# Patient Record
Sex: FEMALE | Race: BLACK OR AFRICAN AMERICAN | NOT HISPANIC OR LATINO
[De-identification: names, ages, dates, MRNs, and addresses within clinical notes are randomized per-mention and may not be internally consistent; named-entity substitution may affect disease eponyms.]

---

## 2019-10-18 ENCOUNTER — APPOINTMENT (OUTPATIENT)
Age: 64
End: 2019-10-18
Payer: MEDICARE

## 2019-10-18 ENCOUNTER — NON-APPOINTMENT (OUTPATIENT)
Age: 64
End: 2019-10-18

## 2019-10-18 PROCEDURE — 92250 FUNDUS PHOTOGRAPHY W/I&R: CPT

## 2019-10-18 PROCEDURE — 92020 GONIOSCOPY: CPT

## 2019-10-18 PROCEDURE — 92083 EXTENDED VISUAL FIELD XM: CPT

## 2019-10-18 PROCEDURE — 92004 COMPRE OPH EXAM NEW PT 1/>: CPT

## 2019-10-26 ENCOUNTER — INPATIENT (INPATIENT)
Facility: HOSPITAL | Age: 64
LOS: 1 days | Discharge: HOME | DRG: 204 | End: 2019-10-28
Attending: INTERNAL MEDICINE | Admitting: INTERNAL MEDICINE
Payer: MEDICARE

## 2019-10-26 VITALS
OXYGEN SATURATION: 99 % | HEIGHT: 66 IN | DIASTOLIC BLOOD PRESSURE: 87 MMHG | TEMPERATURE: 98 F | WEIGHT: 158.95 LBS | HEART RATE: 108 BPM | RESPIRATION RATE: 20 BRPM | SYSTOLIC BLOOD PRESSURE: 133 MMHG

## 2019-10-26 DIAGNOSIS — Z90.49 ACQUIRED ABSENCE OF OTHER SPECIFIED PARTS OF DIGESTIVE TRACT: Chronic | ICD-10-CM

## 2019-10-26 LAB
ALBUMIN SERPL ELPH-MCNC: 4.1 G/DL — SIGNIFICANT CHANGE UP (ref 3.3–5)
ALP SERPL-CCNC: 61 U/L — SIGNIFICANT CHANGE UP (ref 40–120)
ALT FLD-CCNC: 23 U/L — SIGNIFICANT CHANGE UP (ref 10–45)
ANION GAP SERPL CALC-SCNC: 12 MMOL/L — SIGNIFICANT CHANGE UP (ref 5–17)
APPEARANCE UR: CLEAR — SIGNIFICANT CHANGE UP
AST SERPL-CCNC: 65 U/L — HIGH (ref 10–40)
BILIRUB SERPL-MCNC: 0.3 MG/DL — SIGNIFICANT CHANGE UP (ref 0.2–1.2)
BILIRUB UR-MCNC: NEGATIVE — SIGNIFICANT CHANGE UP
BUN SERPL-MCNC: 8 MG/DL — SIGNIFICANT CHANGE UP (ref 7–23)
CALCIUM SERPL-MCNC: 10 MG/DL — SIGNIFICANT CHANGE UP (ref 8.4–10.5)
CHLORIDE SERPL-SCNC: 102 MMOL/L — SIGNIFICANT CHANGE UP (ref 96–108)
CO2 SERPL-SCNC: 24 MMOL/L — SIGNIFICANT CHANGE UP (ref 22–31)
COLOR SPEC: YELLOW — SIGNIFICANT CHANGE UP
CREAT SERPL-MCNC: 0.74 MG/DL — SIGNIFICANT CHANGE UP (ref 0.5–1.3)
D DIMER BLD IA.RAPID-MCNC: 383 NG/ML DDU — HIGH
DIFF PNL FLD: NEGATIVE — SIGNIFICANT CHANGE UP
GLUCOSE SERPL-MCNC: 142 MG/DL — HIGH (ref 70–99)
GLUCOSE UR QL: NEGATIVE — SIGNIFICANT CHANGE UP
HCT VFR BLD CALC: 39.3 % — SIGNIFICANT CHANGE UP (ref 34.5–45)
HGB BLD-MCNC: 12 G/DL — SIGNIFICANT CHANGE UP (ref 11.5–15.5)
KETONES UR-MCNC: NEGATIVE — SIGNIFICANT CHANGE UP
LEUKOCYTE ESTERASE UR-ACNC: NEGATIVE — SIGNIFICANT CHANGE UP
MAGNESIUM SERPL-MCNC: 1.8 MG/DL — SIGNIFICANT CHANGE UP (ref 1.6–2.6)
MCHC RBC-ENTMCNC: 28.6 PG — SIGNIFICANT CHANGE UP (ref 27–34)
MCHC RBC-ENTMCNC: 30.5 GM/DL — LOW (ref 32–36)
MCV RBC AUTO: 93.6 FL — SIGNIFICANT CHANGE UP (ref 80–100)
NITRITE UR-MCNC: NEGATIVE — SIGNIFICANT CHANGE UP
NRBC # BLD: 0 /100 WBCS — SIGNIFICANT CHANGE UP (ref 0–0)
NT-PROBNP SERPL-SCNC: 7 PG/ML — SIGNIFICANT CHANGE UP (ref 0–300)
PH UR: 6.5 — SIGNIFICANT CHANGE UP (ref 5–8)
PLATELET # BLD AUTO: 287 K/UL — SIGNIFICANT CHANGE UP (ref 150–400)
POTASSIUM SERPL-MCNC: 4 MMOL/L — SIGNIFICANT CHANGE UP (ref 3.5–5.3)
POTASSIUM SERPL-SCNC: 4 MMOL/L — SIGNIFICANT CHANGE UP (ref 3.5–5.3)
PROT SERPL-MCNC: 7.7 G/DL — SIGNIFICANT CHANGE UP (ref 6–8.3)
PROT UR-MCNC: NEGATIVE MG/DL — SIGNIFICANT CHANGE UP
RBC # BLD: 4.2 M/UL — SIGNIFICANT CHANGE UP (ref 3.8–5.2)
RBC # FLD: 13.5 % — SIGNIFICANT CHANGE UP (ref 10.3–14.5)
SODIUM SERPL-SCNC: 138 MMOL/L — SIGNIFICANT CHANGE UP (ref 135–145)
SP GR SPEC: 1.01 — SIGNIFICANT CHANGE UP (ref 1–1.03)
TROPONIN T SERPL-MCNC: 0.07 NG/ML — CRITICAL HIGH (ref 0–0.01)
UROBILINOGEN FLD QL: 0.2 E.U./DL — SIGNIFICANT CHANGE UP
WBC # BLD: 2.82 K/UL — LOW (ref 3.8–10.5)
WBC # FLD AUTO: 2.82 K/UL — LOW (ref 3.8–10.5)

## 2019-10-26 PROCEDURE — 71275 CT ANGIOGRAPHY CHEST: CPT | Mod: 26

## 2019-10-26 PROCEDURE — 99285 EMERGENCY DEPT VISIT HI MDM: CPT | Mod: 25

## 2019-10-26 PROCEDURE — 71046 X-RAY EXAM CHEST 2 VIEWS: CPT | Mod: 26

## 2019-10-26 PROCEDURE — 93010 ELECTROCARDIOGRAM REPORT: CPT

## 2019-10-26 RX ORDER — BUDESONIDE AND FORMOTEROL FUMARATE DIHYDRATE 160; 4.5 UG/1; UG/1
2 AEROSOL RESPIRATORY (INHALATION)
Refills: 0 | Status: DISCONTINUED | OUTPATIENT
Start: 2019-10-26 | End: 2019-10-28

## 2019-10-26 RX ORDER — ALBUTEROL 90 UG/1
2 AEROSOL, METERED ORAL EVERY 6 HOURS
Refills: 0 | Status: DISCONTINUED | OUTPATIENT
Start: 2019-10-26 | End: 2019-10-28

## 2019-10-26 RX ORDER — ASPIRIN/CALCIUM CARB/MAGNESIUM 324 MG
325 TABLET ORAL ONCE
Refills: 0 | Status: COMPLETED | OUTPATIENT
Start: 2019-10-26 | End: 2019-10-26

## 2019-10-26 RX ORDER — MECLIZINE HCL 12.5 MG
25 TABLET ORAL THREE TIMES A DAY
Refills: 0 | Status: DISCONTINUED | OUTPATIENT
Start: 2019-10-26 | End: 2019-10-28

## 2019-10-26 RX ORDER — HYDROXYCHLOROQUINE SULFATE 200 MG
200 TABLET ORAL
Refills: 0 | Status: DISCONTINUED | OUTPATIENT
Start: 2019-10-26 | End: 2019-10-28

## 2019-10-26 RX ORDER — PANTOPRAZOLE SODIUM 20 MG/1
40 TABLET, DELAYED RELEASE ORAL
Refills: 0 | Status: DISCONTINUED | OUTPATIENT
Start: 2019-10-26 | End: 2019-10-28

## 2019-10-26 RX ORDER — LOSARTAN POTASSIUM 100 MG/1
25 TABLET, FILM COATED ORAL DAILY
Refills: 0 | Status: DISCONTINUED | OUTPATIENT
Start: 2019-10-26 | End: 2019-10-28

## 2019-10-26 RX ORDER — INFLUENZA VIRUS VACCINE 15; 15; 15; 15 UG/.5ML; UG/.5ML; UG/.5ML; UG/.5ML
0.5 SUSPENSION INTRAMUSCULAR ONCE
Refills: 0 | Status: DISCONTINUED | OUTPATIENT
Start: 2019-10-26 | End: 2019-10-28

## 2019-10-26 RX ORDER — ACETAMINOPHEN 500 MG
650 TABLET ORAL EVERY 6 HOURS
Refills: 0 | Status: DISCONTINUED | OUTPATIENT
Start: 2019-10-26 | End: 2019-10-28

## 2019-10-26 RX ORDER — NITROGLYCERIN 6.5 MG
0.4 CAPSULE, EXTENDED RELEASE ORAL
Refills: 0 | Status: DISCONTINUED | OUTPATIENT
Start: 2019-10-26 | End: 2019-10-28

## 2019-10-26 RX ORDER — ONDANSETRON 8 MG/1
4 TABLET, FILM COATED ORAL THREE TIMES A DAY
Refills: 0 | Status: DISCONTINUED | OUTPATIENT
Start: 2019-10-26 | End: 2019-10-28

## 2019-10-26 RX ORDER — ASPIRIN/CALCIUM CARB/MAGNESIUM 324 MG
81 TABLET ORAL DAILY
Refills: 0 | Status: DISCONTINUED | OUTPATIENT
Start: 2019-10-27 | End: 2019-10-28

## 2019-10-26 RX ORDER — IPRATROPIUM/ALBUTEROL SULFATE 18-103MCG
3 AEROSOL WITH ADAPTER (GRAM) INHALATION
Refills: 0 | Status: DISCONTINUED | OUTPATIENT
Start: 2019-10-26 | End: 2019-10-28

## 2019-10-26 RX ADMIN — Medication 325 MILLIGRAM(S): at 18:31

## 2019-10-26 NOTE — ED ADULT NURSE NOTE - OBJECTIVE STATEMENT
Pt. w/ Hx of HTN, asthma, and RA c/o on/off palpitations for over a month and on/off SOB x 1 month. Pt. 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 Pt. also reports nonproductive cough in last month, does not recall for how long. Pt. speaking in clear complete sentences on RA, breath sounds clear bilaterally on auscultation. Pt. also c/o on/off LLQ pain since last night, states she has Hx of ?fibroid/cyst. Pt. denies fever, chills, body aches. Ambulatory w/ cane at baseline due to RA.

## 2019-10-26 NOTE — H&P ADULT - NSHPLABSRESULTS_GEN_ALL_CORE
Troponin T, Serum (10.26.19 @ 17:03)    Troponin T, Serum: 0.07: TYPE:(C=Critical, N=Notification, A=Abnormal) C  TESTS: _TROPT  DATE/TIME CALLED: _10/26/19 17:46  CALLED TO: NOREBRT HUGHES  READ BACK (2 Patient Identifiers)(Y/N): _Y  READ BACK VALUES (Y/N): _Y  CALLED BY: CHARLETTE  Reference interval for troponin T is </= 0.01 ng/mL which includes the  99th percentile of a healthy population. Troponin T results are not  interchangeable with troponin I results. ng/mL  D-Dimer Assay, Quantitative (10.26.19 @ 17:03)    D-Dimer Assay, Quantitative: 383: The negative cutoff limit for DVT or PE is 230 D-DU ng/mL. This test  should be used as an aid in diagnosis and not be used to exclude deep  vein thrombosis or pulmonary embolism. ng/mL DDU

## 2019-10-26 NOTE — H&P ADULT - NSHPRISKHIVSCREEN_GEN_ALL_CORE
Mild oral stage dysphagia characterized by anterior loss with liquids, inconsistently, due to decreased labial seal on the right & by prolonged bolus preparation with chewable solids likely d/t sleepy/post-op status & not a true mechanical dysphagia. Pharyngeal swallow appears intact with no overt signs of aspiration at this time. Offered and patient declined

## 2019-10-26 NOTE — ED PROVIDER NOTE - DIAGNOSTIC INTERPRETATION
ER Physician: Maye Pavon MD  CHEST XRAY INTERPRETATION: lungs clear, heart shadow normal, bony structures intact

## 2019-10-26 NOTE — H&P ADULT - ASSESSMENT
Patient is a 64 year old female with PMHx of RA, Asthma, HTN, who presents to [facility] ED with complaints of chest pain and SOB for the past month. Patient reports pain is a brief stabbing pain beneath the left breast that comes and goes at rest and is with out radiation and not worsened with exertion. She also reports SOB which is occurring at rest but worsened with ambulation. She reports symptoms were worse last night and she had a dry hacking cough which prompted her to come to ED for further evaluation. She does admit to intermittent dizziness but denies palpitations, orthopnea, pnd, palpitations, LE edema or syncope. There is no prior hx of CAD/CHF/Arrythmia. On arrival Troponin was noted to be 0.07 and D-Dimer was elevated at 383. BNP was 7. Patient denies any calf pain or cramping. 12 Lead EKG revealed SR with non specific ST changes.    Impression  1. Atypical Chest Pain  2. Dyspnea  3. HTN  4. RA  5. Asthma  6. Minimally elevated troponin 0.07  7. Elevated D-Dimer 383  8. No CHF, BNP 7    Recommendations  1. Trend troponin  2. Add ASA 81  3. Check CTA PE protocol  4. Check 2D echo  5. Ischemic eval  6. Cont ARB

## 2019-10-26 NOTE — ED ADULT TRIAGE NOTE - CHIEF COMPLAINT QUOTE
pt c/o intermittent SOB on exertion and palpitations for about 1 month. pt report dry cough as well. denies fever, chills.  ekg in progress.

## 2019-10-26 NOTE — ED PROVIDER NOTE - OBJECTIVE STATEMENT
hx of RA, htn w complaints of shob for one month, w assoc dry cough. no chest pain, has had leg pain one month ago. no early fH CAD, non smoker, no personal fH CAD.  Pt has not tried anything for symptoms, no other aggravating or relieving factors. 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 hx of RA, htn w complaints of shob for one month worse w exertion, w assoc dry cough. no chest pain, has had leg pain one month ago. no early fH CAD, non smoker, no personal hx of  CAD.  Pt has not tried anything for symptoms, no other aggravating or relieving factors. 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

## 2019-10-26 NOTE — H&P ADULT - GASTROINTESTINAL DETAILS
normal/bowel sounds normal/no organomegaly/no bruit/no distention/nontender/soft/no masses palpable/no guarding/no rebound tenderness/no rigidity

## 2019-10-26 NOTE — H&P ADULT - HISTORY OF PRESENT ILLNESS
Patient is a 64 year old female with PMHx of RA, Asthma, HTN, who presents to [facility] ED with complaints of chest pain and SOB for the past month. Patient reports pain is a brief stabbing pain beneath the left breast that comes and goes at rest and is with out radiation and not worsened with exertion. She also reports SOB which is occurring at rest but worsened with ambulation. She reports symptoms were worse last night and she had a dry hacking cough which prompted her to come to ED for further evaluation. She does admit to intermittent dizziness but denies palpitations, orthopnea, pnd, palpitations, LE edema or syncope. There is no prior hx of CAD/CHF/Arrythmia. On arrival Troponin was noted to be 0.07 and D-Dimer was elevated at 383. BNP was 7. Patient denies any calf pain or cramping. 12 Lead EKG revealed SR with non specific ST changes.

## 2019-10-26 NOTE — ED PROVIDER NOTE - CLINICAL SUMMARY MEDICAL DECISION MAKING FREE TEXT BOX
Pt w sob, worse w exertion, no sob, cp at present at rest. well appearing, labs +trop, mildly elevated d-dimer, CTA negative. will admit for further ACS eval.

## 2019-10-27 ENCOUNTER — TRANSCRIPTION ENCOUNTER (OUTPATIENT)
Age: 64
End: 2019-10-27

## 2019-10-27 DIAGNOSIS — J45.909 UNSPECIFIED ASTHMA, UNCOMPLICATED: ICD-10-CM

## 2019-10-27 DIAGNOSIS — I10 ESSENTIAL (PRIMARY) HYPERTENSION: ICD-10-CM

## 2019-10-27 DIAGNOSIS — R06.00 DYSPNEA, UNSPECIFIED: ICD-10-CM

## 2019-10-27 DIAGNOSIS — R07.9 CHEST PAIN, UNSPECIFIED: ICD-10-CM

## 2019-10-27 DIAGNOSIS — M06.9 RHEUMATOID ARTHRITIS, UNSPECIFIED: ICD-10-CM

## 2019-10-27 LAB
ANION GAP SERPL CALC-SCNC: 13 MMOL/L — SIGNIFICANT CHANGE UP (ref 5–17)
APTT BLD: 27.3 SEC — LOW (ref 27.5–36.3)
BUN SERPL-MCNC: 13 MG/DL — SIGNIFICANT CHANGE UP (ref 7–23)
CALCIUM SERPL-MCNC: 9.7 MG/DL — SIGNIFICANT CHANGE UP (ref 8.4–10.5)
CHLORIDE SERPL-SCNC: 103 MMOL/L — SIGNIFICANT CHANGE UP (ref 96–108)
CHOLEST SERPL-MCNC: 232 MG/DL — HIGH (ref 10–199)
CO2 SERPL-SCNC: 24 MMOL/L — SIGNIFICANT CHANGE UP (ref 22–31)
CREAT SERPL-MCNC: 0.83 MG/DL — SIGNIFICANT CHANGE UP (ref 0.5–1.3)
CRP SERPL-MCNC: 1.27 MG/DL — HIGH (ref 0–0.4)
ERYTHROCYTE [SEDIMENTATION RATE] IN BLOOD: 55 MM/HR — HIGH
GLUCOSE SERPL-MCNC: 96 MG/DL — SIGNIFICANT CHANGE UP (ref 70–99)
HCT VFR BLD CALC: 37.9 % — SIGNIFICANT CHANGE UP (ref 34.5–45)
HCV AB S/CO SERPL IA: 0.06 S/CO — SIGNIFICANT CHANGE UP
HCV AB SERPL-IMP: SIGNIFICANT CHANGE UP
HDLC SERPL-MCNC: 45 MG/DL — LOW
HGB BLD-MCNC: 11.5 G/DL — SIGNIFICANT CHANGE UP (ref 11.5–15.5)
INR BLD: 1.04 — SIGNIFICANT CHANGE UP (ref 0.88–1.16)
LIPID PNL WITH DIRECT LDL SERPL: 170 MG/DL — HIGH
MCHC RBC-ENTMCNC: 28.7 PG — SIGNIFICANT CHANGE UP (ref 27–34)
MCHC RBC-ENTMCNC: 30.3 GM/DL — LOW (ref 32–36)
MCV RBC AUTO: 94.5 FL — SIGNIFICANT CHANGE UP (ref 80–100)
NRBC # BLD: 0 /100 WBCS — SIGNIFICANT CHANGE UP (ref 0–0)
PLATELET # BLD AUTO: 283 K/UL — SIGNIFICANT CHANGE UP (ref 150–400)
POTASSIUM SERPL-MCNC: 4.3 MMOL/L — SIGNIFICANT CHANGE UP (ref 3.5–5.3)
POTASSIUM SERPL-SCNC: 4.3 MMOL/L — SIGNIFICANT CHANGE UP (ref 3.5–5.3)
PROTHROM AB SERPL-ACNC: 11.8 SEC — SIGNIFICANT CHANGE UP (ref 10–12.9)
RBC # BLD: 4.01 M/UL — SIGNIFICANT CHANGE UP (ref 3.8–5.2)
RBC # FLD: 13.8 % — SIGNIFICANT CHANGE UP (ref 10.3–14.5)
SODIUM SERPL-SCNC: 140 MMOL/L — SIGNIFICANT CHANGE UP (ref 135–145)
TOTAL CHOLESTEROL/HDL RATIO MEASUREMENT: 5.2 RATIO — SIGNIFICANT CHANGE UP (ref 3.3–7.1)
TRIGL SERPL-MCNC: 86 MG/DL — SIGNIFICANT CHANGE UP (ref 10–149)
TROPONIN T SERPL-MCNC: 0.1 NG/ML — CRITICAL HIGH (ref 0–0.01)
TSH SERPL-MCNC: 0.6 UIU/ML — SIGNIFICANT CHANGE UP (ref 0.35–4.94)
WBC # BLD: 2.6 K/UL — LOW (ref 3.8–10.5)
WBC # FLD AUTO: 2.6 K/UL — LOW (ref 3.8–10.5)

## 2019-10-27 PROCEDURE — 93010 ELECTROCARDIOGRAM REPORT: CPT

## 2019-10-27 PROCEDURE — 93458 L HRT ARTERY/VENTRICLE ANGIO: CPT | Mod: 26

## 2019-10-27 PROCEDURE — 99222 1ST HOSP IP/OBS MODERATE 55: CPT

## 2019-10-27 RX ORDER — ASPIRIN/CALCIUM CARB/MAGNESIUM 324 MG
243 TABLET ORAL ONCE
Refills: 0 | Status: DISCONTINUED | OUTPATIENT
Start: 2019-10-27 | End: 2019-10-27

## 2019-10-27 RX ORDER — ASPIRIN/CALCIUM CARB/MAGNESIUM 324 MG
325 TABLET ORAL ONCE
Refills: 0 | Status: COMPLETED | OUTPATIENT
Start: 2019-10-27 | End: 2019-10-27

## 2019-10-27 RX ORDER — ACETAMINOPHEN 500 MG
650 TABLET ORAL ONCE
Refills: 0 | Status: COMPLETED | OUTPATIENT
Start: 2019-10-27 | End: 2019-10-27

## 2019-10-27 RX ORDER — DIPHENHYDRAMINE HCL 50 MG
50 CAPSULE ORAL ONCE
Refills: 0 | Status: DISCONTINUED | OUTPATIENT
Start: 2019-10-27 | End: 2019-10-28

## 2019-10-27 RX ORDER — HYDROCORTISONE 20 MG
200 TABLET ORAL ONCE
Refills: 0 | Status: COMPLETED | OUTPATIENT
Start: 2019-10-27 | End: 2019-10-27

## 2019-10-27 RX ORDER — SODIUM CHLORIDE 9 MG/ML
300 INJECTION INTRAMUSCULAR; INTRAVENOUS; SUBCUTANEOUS
Refills: 0 | Status: DISCONTINUED | OUTPATIENT
Start: 2019-10-27 | End: 2019-10-28

## 2019-10-27 RX ORDER — HYDROXYCHLOROQUINE SULFATE 200 MG
1 TABLET ORAL
Qty: 0 | Refills: 0 | DISCHARGE
Start: 2019-10-27

## 2019-10-27 RX ORDER — CLOPIDOGREL BISULFATE 75 MG/1
600 TABLET, FILM COATED ORAL ONCE
Refills: 0 | Status: COMPLETED | OUTPATIENT
Start: 2019-10-27 | End: 2019-10-27

## 2019-10-27 RX ADMIN — SODIUM CHLORIDE 75 MILLILITER(S): 9 INJECTION INTRAMUSCULAR; INTRAVENOUS; SUBCUTANEOUS at 13:28

## 2019-10-27 RX ADMIN — LOSARTAN POTASSIUM 25 MILLIGRAM(S): 100 TABLET, FILM COATED ORAL at 06:42

## 2019-10-27 RX ADMIN — Medication 200 MILLIGRAM(S): at 18:27

## 2019-10-27 RX ADMIN — PANTOPRAZOLE SODIUM 40 MILLIGRAM(S): 20 TABLET, DELAYED RELEASE ORAL at 06:44

## 2019-10-27 RX ADMIN — CLOPIDOGREL BISULFATE 600 MILLIGRAM(S): 75 TABLET, FILM COATED ORAL at 11:56

## 2019-10-27 RX ADMIN — Medication 200 MILLIGRAM(S): at 10:51

## 2019-10-27 RX ADMIN — Medication 325 MILLIGRAM(S): at 10:51

## 2019-10-27 RX ADMIN — BUDESONIDE AND FORMOTEROL FUMARATE DIHYDRATE 2 PUFF(S): 160; 4.5 AEROSOL RESPIRATORY (INHALATION) at 21:22

## 2019-10-27 RX ADMIN — Medication 200 MILLIGRAM(S): at 06:42

## 2019-10-27 RX ADMIN — Medication 650 MILLIGRAM(S): at 18:28

## 2019-10-27 RX ADMIN — Medication 4 MILLIGRAM(S): at 06:42

## 2019-10-27 RX ADMIN — BUDESONIDE AND FORMOTEROL FUMARATE DIHYDRATE 2 PUFF(S): 160; 4.5 AEROSOL RESPIRATORY (INHALATION) at 08:57

## 2019-10-27 NOTE — DISCHARGE NOTE PROVIDER - CARE PROVIDERS DIRECT ADDRESSES
,[email or web address]_Unknown

## 2019-10-27 NOTE — DISCHARGE NOTE PROVIDER - PROVIDER TOKENS
PROVIDER:[TOKEN:[[id number]],FOLLOWUP:[1-3 days]] PROVIDER:[TOKEN:[[id number]],FOLLOWUP:[2 weeks]],PROVIDER:[TOKEN:[[id number]],FOLLOWUP:[2 weeks]]

## 2019-10-27 NOTE — PROVIDER CONTACT NOTE (CRITICAL VALUE NOTIFICATION) - BACKGROUND
Patient is a 64 year old female with PMHx of RA, Asthma, HTN, who presents to [facility] ED with complaints of chest pain and SOB for the past month. Patient reports pain is a brief stabbing pain beneath the left breast that comes and goes at rest and is with out radiation and not worsened with exertion.

## 2019-10-27 NOTE — PROGRESS NOTE ADULT - ASSESSMENT
This is a 69 year old female with PMHx of RA, asthma, and HTN with recent negative stress test workup for "fast heart rate" 6 months ago presenting to [facility] ED with complaints of intermittent substernal chest pain (7/10) radiating under left breast and SOB for 1 month, with symptoms worsening with exertion.  Patient admitted to [facility] for further work-up.  Patient now with uptrending troponin (0.07->0.07->0.1)

## 2019-10-27 NOTE — DISCHARGE NOTE PROVIDER - HOSPITAL COURSE
This is a 69 year old female with PMHx of RA, asthma, and HTN with recent negative stress test workup for "fast heart rate" 6 months ago presenting to [facility] ED with complaints of intermittent substernal chest pain (7/10) radiating under left breast and SOB for 1 month, with symptoms worsening with exertion.  Patient admitted to [facility] for further work-up.  There is no prior hx of CAD/CHF/Arrythmia. On arrival D-Dimer was elevated at 383. BNP was 7. Patient denies any calf pain or cramping. 12 Lead EKG revealed SR with non specific ST changes.  Patient with uptrending troponin (0.07->0.07->0.1).  CT PE protocol negative for acute findings of PE.   Patient underwent coronary angiography this morning 10/27/2019, findings of clean coronary arteries and EF 65%.  Cath access via right radial artery-- clean/dry/intact with no s/s of hematoma or bleeding.  Patient is stable for discharge home. 68 y/o female with a PMHx of HTN, RA, Asthma, and with a recent negative outpatient Stress Test as part of workup for "fast heart rate" in 5/2019 who presented to [facility] ED 10/26/2019 with c/o intermittent exertional substernal chest pain, 7/10, radiating under left breast and SOB for 1 month. D-Dimer was elevated at 383. Patient denies any calf pain or cramping. PE protocol negative for acute findings of PE. 12 Lead EKG revealed SR with non specific ST changes. Patient with uptrending troponin (0.07->0.07->0.1). Patient now s/p Cardiac Catheterization 10/27/2019: normal coronary arteries, normal LV function, EF 65%, LVEDP 9, Right Radial access.        Echocardiogram         Patient remained asymptomatic post-procedure denying chest pain, palpitations, shortness of breath, nausea, and vomiting. Patient is ambulating and voiding without issue. Vitals remained stable overnight and Telemetry was reviewed. Labs stable this morning. Patient deemed stable for discharge today per  --. Patient has been given discharge instructions including medication regimen, access site management, and follow up. Patient's medications have been electronically prescribed to their preferred Pharmacy. 68 y/o Female with Shellfish allergy and Statin intolerance (myalgias) and a PMHx of HTN, RA, Asthma, and with a recent negative outpatient Stress Test as part of workup for "fast heart rate" in 5/2019 who presented to [facility] ED 10/26/2019 with c/o intermittent exertional substernal chest pain, 7/10, radiating under left breast and SOB for 1 month. D-Dimer was elevated at 383. Patient denies any calf pain or cramping. PE protocol negative for acute findings of PE. 12 Lead EKG revealed SR with non specific ST changes. Patient with uptrending troponin (0.07->0.07->0.1). Patient now s/p Cardiac Catheterization 10/27/2019: normal coronary arteries, normal LV function, EF 65%, LVEDP 9, Right Radial access. Echocardiogram 10/28/2019: EF: 65.0 %, trace PA/TR, mild MR, Pulmonary artery systolic pressure (estimated using the tricuspid regurgitant gradient and an estimate of right atrial pressure) is 28.6 mmHg. Patient remained asymptomatic post-procedure denying chest pain, palpitations, shortness of breath, nausea, and vomiting. Patient is ambulating and voiding without issue. Vitals remained stable overnight and Telemetry was reviewed. Labs stable this morning. Patient started on Zetia 10mg PO QHS given Statin intolerance (myalgias). Patient deemed stable for discharge today per Dr. Chinchilla. Patient has been given discharge instructions including medication regimen, access site management, and follow up. Patient's medications have been electronically prescribed to her preferred Pharmacy.

## 2019-10-27 NOTE — DISCHARGE NOTE PROVIDER - NSDCCPCAREPLAN_GEN_ALL_CORE_FT
PRINCIPAL DISCHARGE DIAGNOSIS  Diagnosis: Dyspnea  Assessment and Plan of Treatment: You came into the hospital with shortness of breath.  You received a CT scan of your chest which showed now acute issues or blood clots.  You had cardiac tests performed which show that the arteries of your heart have no blockages. PRINCIPAL DISCHARGE DIAGNOSIS  Diagnosis: Dyspnea  Assessment and Plan of Treatment: You came into the hospital with shortness of breath.  You received a CT scan of your chest which showed now acute issues or blood clots.  You had cardiac tests performed which show that the arteries of your heart have no blockages.      SECONDARY DISCHARGE DIAGNOSES  Diagnosis: HTN (hypertension)  Assessment and Plan of Treatment: You have a diagnosis of Hypertension or elevated blood pressure. Please continue taking your medications as listed to keep your blood pressure controlled. In addition, there are multiple lifestyle modifications that have been proven to lower blood pressure: maintaining a healthy body weight, engaging in regular physical activity for at least 30 minutes per day on most days, and consuming a diet rich in fruits, vegetables, and low-fat dairy products with a reduced amount of total and saturated fats and sodium.  For blood pressures at home that are too high or low please see your Doctor or go to the Emergency Room as necessary. PRINCIPAL DISCHARGE DIAGNOSIS  Diagnosis: Dyspnea  Assessment and Plan of Treatment: You came into the hospital with shortness of breath.  You received a CT scan of your chest which showed now acute issues or blood clots.  You had cardiac tests performed which show that the arteries of your heart have no blockages. You underwent a Diagnostic Cardiac Catheterization yesterday. Avoid strenuous activity and heavy lifting for the next five days. You may shower, but please avoid baths, hot tubs, or swimming for Five days to prevent infection. For any bleeding or hematoma formation (hardened blood collection under the skin) at the access site, please hold pressure and go to the Emergency Room. Please seek medical attention for recurrent or severe chest pain. All of your prescriptions have been sent electronically to your Pharmacy. Please follow up with your cardiologist in 1-2 week.      SECONDARY DISCHARGE DIAGNOSES  Diagnosis: HLD (hyperlipidemia)  Assessment and Plan of Treatment: You have a diagnosis of high Cholesterol. Please start taking Zetia 10mg every night to keep your Cholesterol low. High Cholesterol contributes to Heart Disease. You can speak with your Cardiologist about initiating a Proprotein convertase subtilisin kexin type 9 (PCSK9) inhibitor injection medication as well.    Diagnosis: HTN (hypertension)  Assessment and Plan of Treatment: You have a diagnosis of Hypertension or elevated blood pressure. Please continue taking your medications as listed to keep your blood pressure controlled. In addition, there are multiple lifestyle modifications that have been proven to lower blood pressure: maintaining a healthy body weight, engaging in regular physical activity for at least 30 minutes per day on most days, and consuming a diet rich in fruits, vegetables, and low-fat dairy products with a reduced amount of total and saturated fats and sodium.  For blood pressures at home that are too high or low please see your Doctor or go to the Emergency Room as necessary.

## 2019-10-27 NOTE — DISCHARGE NOTE PROVIDER - CARE PROVIDER_API CALL
Levon Norman)  Cardiovascular Disease  [address], [address]rd [place] [address]  Phone: [phone]  Fax: [phone]  Follow Up Time: 1-3 days Peter Goldberg (MD)  Cardiovascular Disease; Internal Medicine  [address] [id number]  Phone: [phone]  Fax: [phone]  Follow Up Time: 2 weeks    Holley Smith; PhD)  Internal Medicine  [address], [address]  [place] [address]  Phone: [phone]  Fax: [phone]  Follow Up Time: 2 weeks

## 2019-10-27 NOTE — PROVIDER CONTACT NOTE (CRITICAL VALUE NOTIFICATION) - ASSESSMENT
Pt AOx4. Denies chest pain, palpatations and shortness of breathe at rest. Endorses SOB with activity. No EKG changes noted.

## 2019-10-27 NOTE — PROGRESS NOTE ADULT - SUBJECTIVE AND OBJECTIVE BOX
Procedure: LHC, Radial band  Indication: NSTEMI  Complication: none    Result:  1) Normal coronary arteries  2) Normal LV function, EF 65%, LVEDP 9    Plan: Medical management.
Interventional Cardiology NP Adult Progress Note    CC: Chest pain, SOB    Subjective Assessment/Interval HPI:  Patient seen at bedside this morning, endorses intermittent substernal chest pain radiating down left side under breast (7/10) described as sharp, shooting.  Patient also with some SOB and WYATT.  Denies any dizziness, nausea, vomiting, diarrhea, or palpitation.  Of note, patient states having a stress test done "about 6 months ago" due to palpitations and "rapid heart rate in the 120's" although she states the results were negative.  Patient also states that her sister has history of heart failure and other "heart problems."  Concern this morning for uptrending troponin (0.07->0.07->0.1).  Due to ongoing symptoms, cardiac enzymes, and family history, Dr. Garcia will cath today.  Patient has been consented and pre-loaded with aspirin/plavix.  In addition, patient is being prepped for contrast allergy as patient states she is allergic to shellfish and has been pre-medicated for contrast in the past.  	  MEDICATIONS:  losartan 25 milliGRAM(s) Oral daily  nitroglycerin     SubLingual 0.4 milliGRAM(s) SubLingual every 5 minutes PRN    hydroxychloroquine 200 milliGRAM(s) Oral two times a day    ALBUTerol    90 MICROgram(s) HFA Inhaler 2 Puff(s) Inhalation every 6 hours PRN  albuterol/ipratropium for Nebulization. 3 milliLiter(s) Nebulizer every 3 hours PRN  budesonide  80 MICROgram(s)/formoterol 4.5 MICROgram(s) Inhaler 2 Puff(s) Inhalation two times a day  diphenhydrAMINE   Injectable 50 milliGRAM(s) IV Push once    acetaminophen   Tablet .. 650 milliGRAM(s) Oral every 6 hours PRN  aspirin 325 milliGRAM(s) Oral once  meclizine 25 milliGRAM(s) Oral three times a day PRN  ondansetron   Disintegrating Tablet 4 milliGRAM(s) Oral three times a day PRN    pantoprazole    Tablet 40 milliGRAM(s) Oral before breakfast    hydrocortisone sodium succinate Injectable 200 milliGRAM(s) IV Push once  methylPREDNISolone 4 milliGRAM(s) Oral daily    aspirin enteric coated 81 milliGRAM(s) Oral daily  clopidogrel Tablet 600 milliGRAM(s) Oral once  influenza   Vaccine 0.5 milliLiter(s) IntraMuscular once      	  TELEMETRY:     T(C): 36.5 (10-27-19 @ 09:53), Max: 37.1 (10-26-19 @ 17:35)  HR: 69 (10-27-19 @ 08:42) (68 - 108)  BP: 122/84 (10-27-19 @ 08:42) (114/62 - 141/74)  RR: 16 (10-27-19 @ 08:42) (16 - 20)  SpO2: 98% (10-27-19 @ 08:42) (97% - 99%)  Wt(kg): --  I&O's Summary    27 Oct 2019 07:01  -  27 Oct 2019 10:45  --------------------------------------------------------  IN: 180 mL / OUT: 0 mL / NET: 180 mL      Height (cm): 167.64 (10-26 @ 15:52)  Weight (kg): 72.1 (10-26 @ 15:52)  BMI (kg/m2): 25.7 (10-26 @ 15:52)  BSA (m2): 1.81 (10-26 @ 15:52)    Perez:   Central/PICC/Mid Line:                                       	    ECG:  	  RADIOLOGY:   DIAGNOSTIC TESTING:  [ ] Echocardiogram:  [x]  Catheterization: PENDING, to be done today with Dr. Garcia.  [ ] Stress Test:    [ ] MONICA  OTHER: 	    LABS:	 	  CARDIAC MARKERS:  CARDIAC MARKERS ( 27 Oct 2019 06:45 )  x     / 0.10 ng/mL / x     / x     / x      CARDIAC MARKERS ( 26 Oct 2019 21:52 )  x     / 0.07 ng/mL / x     / x     / x      CARDIAC MARKERS ( 26 Oct 2019 17:03 )  x     / 0.07 ng/mL / x     / x     / x                             11.5   2.60  )-----------( 283      ( 27 Oct 2019 06:45 )             37.9     10-27    140  |  103  |  13  ----------------------------<  96  4.3   |  24  |  0.83    Ca    9.7      27 Oct 2019 06:45  Mg     1.8     10-26    TPro  7.7  /  Alb  4.1  /  TBili  0.3  /  DBili  x   /  AST  65<H>  /  ALT  23  /  AlkPhos  61  10-26    proBNP: Serum Pro-Brain Natriuretic Peptide: 7 pg/mL (10-26 @ 17:03)    Lipid Profile: 10-27 Chol 232<H> <H> HDL 45<L> Trig 86  HgA1c:   TSH: Thyroid Stimulating Hormone, Serum: 0.604 uIU/mL (10-27 @ 06:45)

## 2019-10-27 NOTE — PROGRESS NOTE ADULT - PROBLEM SELECTOR PLAN 2
Patient with dyspnea over last month, worse with exertion.  Denies any recent travel.  - Echo ordered  - DDimer 383, CT PE protocol done in ED, negative  - Troponin trended: 0.07->0.07-0.1  - Patient aspirin/plavix loaded, consented for cardiac cath today (10/27) with Dr. Garcia

## 2019-10-28 ENCOUNTER — TRANSCRIPTION ENCOUNTER (OUTPATIENT)
Age: 64
End: 2019-10-28

## 2019-10-28 ENCOUNTER — INBOUND DOCUMENT (OUTPATIENT)
Age: 64
End: 2019-10-28

## 2019-10-28 VITALS — TEMPERATURE: 98 F

## 2019-10-28 LAB
ANION GAP SERPL CALC-SCNC: 12 MMOL/L — SIGNIFICANT CHANGE UP (ref 5–17)
BUN SERPL-MCNC: 16 MG/DL — SIGNIFICANT CHANGE UP (ref 7–23)
CALCIUM SERPL-MCNC: 9.5 MG/DL — SIGNIFICANT CHANGE UP (ref 8.4–10.5)
CHLORIDE SERPL-SCNC: 105 MMOL/L — SIGNIFICANT CHANGE UP (ref 96–108)
CO2 SERPL-SCNC: 25 MMOL/L — SIGNIFICANT CHANGE UP (ref 22–31)
CREAT SERPL-MCNC: 0.74 MG/DL — SIGNIFICANT CHANGE UP (ref 0.5–1.3)
GLUCOSE SERPL-MCNC: 88 MG/DL — SIGNIFICANT CHANGE UP (ref 70–99)
HCT VFR BLD CALC: 37.8 % — SIGNIFICANT CHANGE UP (ref 34.5–45)
HGB BLD-MCNC: 11.5 G/DL — SIGNIFICANT CHANGE UP (ref 11.5–15.5)
MAGNESIUM SERPL-MCNC: 1.8 MG/DL — SIGNIFICANT CHANGE UP (ref 1.6–2.6)
MCHC RBC-ENTMCNC: 28.6 PG — SIGNIFICANT CHANGE UP (ref 27–34)
MCHC RBC-ENTMCNC: 30.4 GM/DL — LOW (ref 32–36)
MCV RBC AUTO: 94 FL — SIGNIFICANT CHANGE UP (ref 80–100)
NRBC # BLD: 0 /100 WBCS — SIGNIFICANT CHANGE UP (ref 0–0)
PLATELET # BLD AUTO: 293 K/UL — SIGNIFICANT CHANGE UP (ref 150–400)
POTASSIUM SERPL-MCNC: 3.9 MMOL/L — SIGNIFICANT CHANGE UP (ref 3.5–5.3)
POTASSIUM SERPL-SCNC: 3.9 MMOL/L — SIGNIFICANT CHANGE UP (ref 3.5–5.3)
RBC # BLD: 4.02 M/UL — SIGNIFICANT CHANGE UP (ref 3.8–5.2)
RBC # FLD: 13.8 % — SIGNIFICANT CHANGE UP (ref 10.3–14.5)
SODIUM SERPL-SCNC: 142 MMOL/L — SIGNIFICANT CHANGE UP (ref 135–145)
WBC # BLD: 4.39 K/UL — SIGNIFICANT CHANGE UP (ref 3.8–10.5)
WBC # FLD AUTO: 4.39 K/UL — SIGNIFICANT CHANGE UP (ref 3.8–10.5)

## 2019-10-28 PROCEDURE — 99239 HOSP IP/OBS DSCHRG MGMT >30: CPT

## 2019-10-28 PROCEDURE — 93306 TTE W/DOPPLER COMPLETE: CPT | Mod: 26

## 2019-10-28 RX ORDER — POTASSIUM CHLORIDE 20 MEQ
20 PACKET (EA) ORAL ONCE
Refills: 0 | Status: COMPLETED | OUTPATIENT
Start: 2019-10-28 | End: 2019-10-28

## 2019-10-28 RX ORDER — EZETIMIBE 10 MG/1
1 TABLET ORAL
Qty: 90 | Refills: 0
Start: 2019-10-28 | End: 2020-01-25

## 2019-10-28 RX ORDER — MAGNESIUM OXIDE 400 MG ORAL TABLET 241.3 MG
800 TABLET ORAL ONCE
Refills: 0 | Status: COMPLETED | OUTPATIENT
Start: 2019-10-28 | End: 2019-10-28

## 2019-10-28 RX ORDER — VALSARTAN 80 MG/1
1 TABLET ORAL
Qty: 90 | Refills: 0
Start: 2019-10-28 | End: 2020-01-25

## 2019-10-28 RX ADMIN — BUDESONIDE AND FORMOTEROL FUMARATE DIHYDRATE 2 PUFF(S): 160; 4.5 AEROSOL RESPIRATORY (INHALATION) at 11:10

## 2019-10-28 RX ADMIN — MAGNESIUM OXIDE 400 MG ORAL TABLET 800 MILLIGRAM(S): 241.3 TABLET ORAL at 07:46

## 2019-10-28 RX ADMIN — Medication 20 MILLIEQUIVALENT(S): at 07:46

## 2019-10-28 RX ADMIN — LOSARTAN POTASSIUM 25 MILLIGRAM(S): 100 TABLET, FILM COATED ORAL at 05:45

## 2019-10-28 RX ADMIN — Medication 81 MILLIGRAM(S): at 11:10

## 2019-10-28 RX ADMIN — Medication 200 MILLIGRAM(S): at 05:45

## 2019-10-28 RX ADMIN — Medication 4 MILLIGRAM(S): at 05:44

## 2019-10-28 RX ADMIN — PANTOPRAZOLE SODIUM 40 MILLIGRAM(S): 20 TABLET, DELAYED RELEASE ORAL at 05:45

## 2019-10-28 NOTE — DISCHARGE NOTE NURSING/CASE MANAGEMENT/SOCIAL WORK - PATIENT PORTAL LINK FT
You can access the FollowMyHealth Patient Portal offered by [facility] by registering at the following website: [email or web address]. By joining [email or web address]’s FollowMyHealth portal, you will also be able to view your health information using other applications (apps) compatible with our system.

## 2019-11-01 DIAGNOSIS — R06.00 DYSPNEA, UNSPECIFIED: ICD-10-CM

## 2019-11-01 DIAGNOSIS — E78.5 HYPERLIPIDEMIA, UNSPECIFIED: ICD-10-CM

## 2019-11-01 DIAGNOSIS — I10 ESSENTIAL (PRIMARY) HYPERTENSION: ICD-10-CM

## 2019-11-01 DIAGNOSIS — M06.9 RHEUMATOID ARTHRITIS, UNSPECIFIED: ICD-10-CM

## 2019-11-01 DIAGNOSIS — R07.89 OTHER CHEST PAIN: ICD-10-CM

## 2019-11-01 DIAGNOSIS — J45.909 UNSPECIFIED ASTHMA, UNCOMPLICATED: ICD-10-CM

## 2019-12-04 PROCEDURE — 86803 HEPATITIS C AB TEST: CPT

## 2019-12-04 PROCEDURE — 36415 COLL VENOUS BLD VENIPUNCTURE: CPT

## 2019-12-04 PROCEDURE — 85027 COMPLETE CBC AUTOMATED: CPT

## 2019-12-04 PROCEDURE — 86140 C-REACTIVE PROTEIN: CPT

## 2019-12-04 PROCEDURE — 83880 ASSAY OF NATRIURETIC PEPTIDE: CPT

## 2019-12-04 PROCEDURE — 80053 COMPREHEN METABOLIC PANEL: CPT

## 2019-12-04 PROCEDURE — 71046 X-RAY EXAM CHEST 2 VIEWS: CPT

## 2019-12-04 PROCEDURE — 93306 TTE W/DOPPLER COMPLETE: CPT

## 2019-12-04 PROCEDURE — 83735 ASSAY OF MAGNESIUM: CPT

## 2019-12-04 PROCEDURE — 93005 ELECTROCARDIOGRAM TRACING: CPT

## 2019-12-04 PROCEDURE — C1887: CPT

## 2019-12-04 PROCEDURE — 85379 FIBRIN DEGRADATION QUANT: CPT

## 2019-12-04 PROCEDURE — C1769: CPT

## 2019-12-04 PROCEDURE — 80048 BASIC METABOLIC PNL TOTAL CA: CPT

## 2019-12-04 PROCEDURE — 84484 ASSAY OF TROPONIN QUANT: CPT

## 2019-12-04 PROCEDURE — 84443 ASSAY THYROID STIM HORMONE: CPT

## 2019-12-04 PROCEDURE — 99285 EMERGENCY DEPT VISIT HI MDM: CPT

## 2019-12-04 PROCEDURE — 81003 URINALYSIS AUTO W/O SCOPE: CPT

## 2019-12-04 PROCEDURE — 85610 PROTHROMBIN TIME: CPT

## 2019-12-04 PROCEDURE — 71275 CT ANGIOGRAPHY CHEST: CPT

## 2019-12-04 PROCEDURE — 94640 AIRWAY INHALATION TREATMENT: CPT

## 2019-12-04 PROCEDURE — 85730 THROMBOPLASTIN TIME PARTIAL: CPT

## 2019-12-04 PROCEDURE — 85652 RBC SED RATE AUTOMATED: CPT

## 2019-12-04 PROCEDURE — 82962 GLUCOSE BLOOD TEST: CPT

## 2019-12-04 PROCEDURE — 80061 LIPID PANEL: CPT

## 2019-12-04 PROCEDURE — C1894: CPT

## 2019-12-17 ENCOUNTER — APPOINTMENT (OUTPATIENT)
Dept: HEART AND VASCULAR | Facility: CLINIC | Age: 64
End: 2019-12-17

## 2019-12-23 ENCOUNTER — APPOINTMENT (OUTPATIENT)
Dept: OPHTHALMOLOGY | Facility: CLINIC | Age: 64
End: 2019-12-23

## 2020-05-01 ENCOUNTER — OUTPATIENT (OUTPATIENT)
Dept: OUTPATIENT SERVICES | Facility: HOSPITAL | Age: 65
LOS: 1 days | End: 2020-05-01
Payer: MEDICARE

## 2020-05-01 DIAGNOSIS — Z90.49 ACQUIRED ABSENCE OF OTHER SPECIFIED PARTS OF DIGESTIVE TRACT: Chronic | ICD-10-CM

## 2020-05-01 PROCEDURE — G9001: CPT

## 2020-05-19 DIAGNOSIS — Z71.89 OTHER SPECIFIED COUNSELING: ICD-10-CM

## 2020-09-22 NOTE — ED PROVIDER NOTE - CARE PLAN
Agustin Hoang Patient Age: 76 year old  MESSAGE:   Received call from [facility] requesting to fax office notes to fax [phone] as soon as possible.      WEIGHT AND HEIGHT:   Wt Readings from Last 1 Encounters:   09/14/20 89.8 kg (198 lb)     Ht Readings from Last 1 Encounters:   09/14/20 6' (1.829 m)     BMI Readings from Last 1 Encounters:   09/14/20 26.85 kg/m²       ALLERGIES:  Zoster vac recomb adjuvanted, Amoxicillin-pot clavulanate, Nsaids, Azithromycin, Ciprofloxacin, and Ibuprofen  Current Outpatient Medications   Medication   • metoPROLOL tartrate (LOPRESSOR) 25 MG tablet   • cephalexin (Keflex) 500 MG capsule   • triamcinolone (ARISTOCORT) 0.1 % cream   • levothyroxine 150 MCG tablet   • morphine SR (MS CONTIN) 15 MG 12 hr tablet   • morphine SR (MS CONTIN) 15 MG 12 hr tablet   • naLOXone (NARCAN) 4 MG/0.1ML nasal spray   • pravastatin (PRAVACHOL) 20 MG tablet   • montelukast (SINGULAIR) 10 MG tablet   • gabapentin (NEURONTIN) 800 MG tablet   • Zinc 50 MG Tab   • magnesium 250 MG tablet   • lisinopril (ZESTRIL) 20 MG tablet   • pantoprazole (PROTONIX) 40 MG tablet   • NOVOLOG 100 UNIT/ML injectable solution   • ciclopirox olamine (LOPROX) 0.77 % topical suspension   • glucagon (GLUCAGON EMERGENCY) 1 MG injection kit   • insulin glargine (LANTUS) 100 UNIT/ML vial solution   • SPIRIVA HANDIHALER 18 MCG capsule for inhaler   • fluticasone-salmeterol (ADVAIR DISKUS) 250-50 MCG/DOSE inhaler   • VENTOLIN  (90 Base) MCG/ACT inhaler   • SOFTCLIX LANCETS Misc   • aspirin 81 MG tablet   • Cholecalciferol (VITAMIN D) 2000 units capsule   • coenzyme Q10 100 MG capsule   • cyanocobalamin, vitamin B12, 100 MCG tablet   • halobetasol (ULTRAVATE) 0.05 % ointment   • nitroGLYcerin (NITROSTAT) 0.4 MG sublingual tablet   • Glucose Blood (BLOOD GLUCOSE TEST STRIPS) Strip   • ACCU-CHEK DMITRIY PLUS test strip     No current facility-administered medications for this visit.      PHARMACY to use:            Pharmacy preference(s) on file:   [id number] - [place], [place] - [address]  [address]  [facility] [address]  Phone: [phone] Fax: [phone]    [id number] - [place], [place] - [address] [facility] [address]  [address]  [facility] [address]  Phone: [phone] Fax: [phone]      CALL BACK INFO: Ok to leave response (including medical information) on answering machine  ROUTING: Patient's physician/staff        PCP: Indu Vyas MD         INS: Payor: [employer] HMO - [place]REYER / Plan: [employer] HMO - DREYER / Product Type: Dreyer Risk   PATIENT ADDRESS:  [address] [place] [id number]     Principal Discharge DX:	Dyspnea Principal Discharge DX:	Dyspnea  Secondary Diagnosis:	Elevated troponin

## 2020-11-23 NOTE — PATIENT PROFILE ADULT - STATED REASON FOR ADMISSION
Discussed preventive care protocol,healthy diet  and  importance of regular exercise and recommend starting or continuing a regular exercise program for good health.  Patient is up-to-date with immunization, mammogram and colonoscopy.      
shortness of breathe and sharp chest pains, disturbing sleep, no appetite

## 2021-04-05 ENCOUNTER — EMERGENCY (EMERGENCY)
Facility: HOSPITAL | Age: 66
LOS: 1 days | Discharge: HOME | End: 2021-04-05
Attending: EMERGENCY MEDICINE | Admitting: HOSPITALIST
Payer: MEDICARE

## 2021-04-05 VITALS
SYSTOLIC BLOOD PRESSURE: 165 MMHG | HEIGHT: 66 IN | OXYGEN SATURATION: 98 % | WEIGHT: 195.11 LBS | HEART RATE: 66 BPM | TEMPERATURE: 98 F | RESPIRATION RATE: 17 BRPM | DIASTOLIC BLOOD PRESSURE: 75 MMHG

## 2021-04-05 DIAGNOSIS — R07.89 OTHER CHEST PAIN: ICD-10-CM

## 2021-04-05 DIAGNOSIS — E78.5 HYPERLIPIDEMIA, UNSPECIFIED: ICD-10-CM

## 2021-04-05 DIAGNOSIS — R07.9 CHEST PAIN, UNSPECIFIED: ICD-10-CM

## 2021-04-05 DIAGNOSIS — R06.02 SHORTNESS OF BREATH: ICD-10-CM

## 2021-04-05 DIAGNOSIS — M06.9 RHEUMATOID ARTHRITIS, UNSPECIFIED: ICD-10-CM

## 2021-04-05 DIAGNOSIS — Z88.0 ALLERGY STATUS TO PENICILLIN: ICD-10-CM

## 2021-04-05 DIAGNOSIS — I10 ESSENTIAL (PRIMARY) HYPERTENSION: ICD-10-CM

## 2021-04-05 DIAGNOSIS — K21.9 GASTRO-ESOPHAGEAL REFLUX DISEASE WITHOUT ESOPHAGITIS: ICD-10-CM

## 2021-04-05 DIAGNOSIS — M32.9 SYSTEMIC LUPUS ERYTHEMATOSUS, UNSPECIFIED: ICD-10-CM

## 2021-04-05 DIAGNOSIS — Z90.49 ACQUIRED ABSENCE OF OTHER SPECIFIED PARTS OF DIGESTIVE TRACT: Chronic | ICD-10-CM

## 2021-04-05 DIAGNOSIS — Z91.013 ALLERGY TO SEAFOOD: ICD-10-CM

## 2021-04-05 DIAGNOSIS — J45.909 UNSPECIFIED ASTHMA, UNCOMPLICATED: ICD-10-CM

## 2021-04-05 LAB
A1C WITH ESTIMATED AVERAGE GLUCOSE RESULT: 5.5 % — SIGNIFICANT CHANGE UP (ref 4–5.6)
ALBUMIN SERPL ELPH-MCNC: 3.9 G/DL — SIGNIFICANT CHANGE UP (ref 3.3–5)
ALP SERPL-CCNC: 56 U/L — SIGNIFICANT CHANGE UP (ref 40–120)
ALT FLD-CCNC: 17 U/L — SIGNIFICANT CHANGE UP (ref 10–45)
ANION GAP SERPL CALC-SCNC: 9 MMOL/L — SIGNIFICANT CHANGE UP (ref 5–17)
APTT BLD: 26.3 SEC — LOW (ref 27.5–35.5)
AST SERPL-CCNC: 30 U/L — SIGNIFICANT CHANGE UP (ref 10–40)
BASOPHILS # BLD AUTO: 0.01 K/UL — SIGNIFICANT CHANGE UP (ref 0–0.2)
BASOPHILS NFR BLD AUTO: 0.2 % — SIGNIFICANT CHANGE UP (ref 0–2)
BILIRUB SERPL-MCNC: 0.2 MG/DL — SIGNIFICANT CHANGE UP (ref 0.2–1.2)
BUN SERPL-MCNC: 15 MG/DL — SIGNIFICANT CHANGE UP (ref 7–23)
CALCIUM SERPL-MCNC: 9.8 MG/DL — SIGNIFICANT CHANGE UP (ref 8.4–10.5)
CHLORIDE SERPL-SCNC: 102 MMOL/L — SIGNIFICANT CHANGE UP (ref 96–108)
CK MB CFR SERPL CALC: 3.8 NG/ML — SIGNIFICANT CHANGE UP (ref 0–6.7)
CK MB CFR SERPL CALC: 4.5 NG/ML — SIGNIFICANT CHANGE UP (ref 0–6.7)
CK SERPL-CCNC: 105 U/L — SIGNIFICANT CHANGE UP (ref 25–170)
CK SERPL-CCNC: 123 U/L — SIGNIFICANT CHANGE UP (ref 25–170)
CO2 SERPL-SCNC: 31 MMOL/L — SIGNIFICANT CHANGE UP (ref 22–31)
CREAT SERPL-MCNC: 0.8 MG/DL — SIGNIFICANT CHANGE UP (ref 0.5–1.3)
CRP SERPL-MCNC: 1.5 MG/L — SIGNIFICANT CHANGE UP (ref 0–4)
D DIMER BLD IA.RAPID-MCNC: 344 NG/ML DDU — HIGH
EOSINOPHIL # BLD AUTO: 0.04 K/UL — SIGNIFICANT CHANGE UP (ref 0–0.5)
EOSINOPHIL NFR BLD AUTO: 1 % — SIGNIFICANT CHANGE UP (ref 0–6)
ERYTHROCYTE [SEDIMENTATION RATE] IN BLOOD: 43 MM/HR — HIGH
ESTIMATED AVERAGE GLUCOSE: 111 MG/DL — SIGNIFICANT CHANGE UP (ref 68–114)
GLUCOSE SERPL-MCNC: 75 MG/DL — SIGNIFICANT CHANGE UP (ref 70–99)
HCT VFR BLD CALC: 38.3 % — SIGNIFICANT CHANGE UP (ref 34.5–45)
HGB BLD-MCNC: 11.9 G/DL — SIGNIFICANT CHANGE UP (ref 11.5–15.5)
IMM GRANULOCYTES NFR BLD AUTO: 0.2 % — SIGNIFICANT CHANGE UP (ref 0–1.5)
INR BLD: 0.9 — SIGNIFICANT CHANGE UP (ref 0.88–1.16)
LYMPHOCYTES # BLD AUTO: 0.65 K/UL — LOW (ref 1–3.3)
LYMPHOCYTES # BLD AUTO: 16.1 % — SIGNIFICANT CHANGE UP (ref 13–44)
MCHC RBC-ENTMCNC: 29 PG — SIGNIFICANT CHANGE UP (ref 27–34)
MCHC RBC-ENTMCNC: 31.1 GM/DL — LOW (ref 32–36)
MCV RBC AUTO: 93.4 FL — SIGNIFICANT CHANGE UP (ref 80–100)
MONOCYTES # BLD AUTO: 0.38 K/UL — SIGNIFICANT CHANGE UP (ref 0–0.9)
MONOCYTES NFR BLD AUTO: 9.4 % — SIGNIFICANT CHANGE UP (ref 2–14)
NEUTROPHILS # BLD AUTO: 2.94 K/UL — SIGNIFICANT CHANGE UP (ref 1.8–7.4)
NEUTROPHILS NFR BLD AUTO: 73.1 % — SIGNIFICANT CHANGE UP (ref 43–77)
NRBC # BLD: 0 /100 WBCS — SIGNIFICANT CHANGE UP (ref 0–0)
PLATELET # BLD AUTO: 324 K/UL — SIGNIFICANT CHANGE UP (ref 150–400)
POTASSIUM SERPL-MCNC: 4 MMOL/L — SIGNIFICANT CHANGE UP (ref 3.5–5.3)
POTASSIUM SERPL-SCNC: 4 MMOL/L — SIGNIFICANT CHANGE UP (ref 3.5–5.3)
PROT SERPL-MCNC: 7.6 G/DL — SIGNIFICANT CHANGE UP (ref 6–8.3)
PROTHROM AB SERPL-ACNC: 10.9 SEC — SIGNIFICANT CHANGE UP (ref 10.6–13.6)
RBC # BLD: 4.1 M/UL — SIGNIFICANT CHANGE UP (ref 3.8–5.2)
RBC # FLD: 12.9 % — SIGNIFICANT CHANGE UP (ref 10.3–14.5)
SARS-COV-2 RNA SPEC QL NAA+PROBE: SIGNIFICANT CHANGE UP
SODIUM SERPL-SCNC: 142 MMOL/L — SIGNIFICANT CHANGE UP (ref 135–145)
TROPONIN T SERPL-MCNC: 0.01 NG/ML — SIGNIFICANT CHANGE UP (ref 0–0.01)
TROPONIN T SERPL-MCNC: 0.02 NG/ML — HIGH (ref 0–0.01)
TROPONIN T SERPL-MCNC: 0.02 NG/ML — HIGH (ref 0–0.01)
TSH SERPL-MCNC: 0.46 UIU/ML — SIGNIFICANT CHANGE UP (ref 0.35–4.94)
WBC # BLD: 4.03 K/UL — SIGNIFICANT CHANGE UP (ref 3.8–10.5)
WBC # FLD AUTO: 4.03 K/UL — SIGNIFICANT CHANGE UP (ref 3.8–10.5)

## 2021-04-05 PROCEDURE — 99285 EMERGENCY DEPT VISIT HI MDM: CPT | Mod: CS,25

## 2021-04-05 PROCEDURE — 93010 ELECTROCARDIOGRAM REPORT: CPT

## 2021-04-05 PROCEDURE — 71046 X-RAY EXAM CHEST 2 VIEWS: CPT | Mod: 26

## 2021-04-05 PROCEDURE — 99223 1ST HOSP IP/OBS HIGH 75: CPT

## 2021-04-05 RX ORDER — VALSARTAN 80 MG/1
80 TABLET ORAL DAILY
Refills: 0 | Status: DISCONTINUED | OUTPATIENT
Start: 2021-04-05 | End: 2021-04-06

## 2021-04-05 RX ORDER — ENOXAPARIN SODIUM 100 MG/ML
40 INJECTION SUBCUTANEOUS EVERY 24 HOURS
Refills: 0 | Status: DISCONTINUED | OUTPATIENT
Start: 2021-04-05 | End: 2021-04-06

## 2021-04-05 RX ORDER — BUDESONIDE AND FORMOTEROL FUMARATE DIHYDRATE 160; 4.5 UG/1; UG/1
2 AEROSOL RESPIRATORY (INHALATION)
Refills: 0 | Status: DISCONTINUED | OUTPATIENT
Start: 2021-04-05 | End: 2021-04-06

## 2021-04-05 RX ORDER — HYDROXYCHLOROQUINE SULFATE 200 MG
200 TABLET ORAL
Refills: 0 | Status: DISCONTINUED | OUTPATIENT
Start: 2021-04-05 | End: 2021-04-06

## 2021-04-05 RX ORDER — ALBUTEROL 90 UG/1
2 AEROSOL, METERED ORAL EVERY 6 HOURS
Refills: 0 | Status: DISCONTINUED | OUTPATIENT
Start: 2021-04-05 | End: 2021-04-06

## 2021-04-05 RX ORDER — ASPIRIN/CALCIUM CARB/MAGNESIUM 324 MG
650 TABLET ORAL THREE TIMES A DAY
Refills: 0 | Status: DISCONTINUED | OUTPATIENT
Start: 2021-04-05 | End: 2021-04-06

## 2021-04-05 RX ORDER — COLCHICINE 0.6 MG
0.6 TABLET ORAL
Refills: 0 | Status: DISCONTINUED | OUTPATIENT
Start: 2021-04-05 | End: 2021-04-05

## 2021-04-05 RX ORDER — PANTOPRAZOLE SODIUM 20 MG/1
40 TABLET, DELAYED RELEASE ORAL
Refills: 0 | Status: DISCONTINUED | OUTPATIENT
Start: 2021-04-05 | End: 2021-04-06

## 2021-04-05 RX ORDER — COLCHICINE 0.6 MG
0.6 TABLET ORAL
Refills: 0 | Status: DISCONTINUED | OUTPATIENT
Start: 2021-04-05 | End: 2021-04-06

## 2021-04-05 RX ADMIN — BUDESONIDE AND FORMOTEROL FUMARATE DIHYDRATE 2 PUFF(S): 160; 4.5 AEROSOL RESPIRATORY (INHALATION) at 23:21

## 2021-04-05 RX ADMIN — ENOXAPARIN SODIUM 40 MILLIGRAM(S): 100 INJECTION SUBCUTANEOUS at 18:43

## 2021-04-05 RX ADMIN — Medication 0.6 MILLIGRAM(S): at 18:43

## 2021-04-05 RX ADMIN — VALSARTAN 80 MILLIGRAM(S): 80 TABLET ORAL at 20:18

## 2021-04-05 RX ADMIN — PANTOPRAZOLE SODIUM 40 MILLIGRAM(S): 20 TABLET, DELAYED RELEASE ORAL at 17:07

## 2021-04-05 RX ADMIN — Medication 50 MILLIGRAM(S): at 18:53

## 2021-04-05 RX ADMIN — Medication 200 MILLIGRAM(S): at 23:20

## 2021-04-05 RX ADMIN — Medication 650 MILLIGRAM(S): at 17:07

## 2021-04-05 RX ADMIN — Medication 50 MILLIGRAM(S): at 23:53

## 2021-04-05 RX ADMIN — Medication 650 MILLIGRAM(S): at 23:21

## 2021-04-05 NOTE — H&P ADULT - ASSESSMENT
67 y/o Female with Shellfish allergy (anaphylaxis, received premedication for contrast in 2019) and Statin intolerance (myalgias) and FamHx of CVA (sister), CHF (mother), PMHx of HTN, GERD, RA and/or Lupus (on daily Plaquenil and Medrol), who presents to [facility] ED on 4/5/21 c/o substernal, non-radiating, sharp, chest pressure, worsened with ambulation of 1 block, improved with rest with associated fatigue and WYATT. Her CP is reproducible on palpation and states she "feels like she is in quicksand when lying flat". She reports she was in her usual state of health prior to last week, but notes she had stopped taking her Plaquenil/Medrol in preparation for 7 days for her 2nd Covid vaccine ([employer], 10 days ago). Pt notes she also had an RA flair last week. Pt is now admitted for further management of chest pain.

## 2021-04-05 NOTE — ED ADULT NURSE REASSESSMENT NOTE - NS ED NURSE REASSESS COMMENT FT1
As per 5Uris PA, patient to receive CTA tomorrow and will be pre-medicated. Will continue to monitor.

## 2021-04-05 NOTE — H&P ADULT - NSHPLABSRESULTS_GEN_ALL_CORE
11.9   4.03  )-----------( 324      ( 05 Apr 2021 13:51 )             38.3     PT/INR - ( 05 Apr 2021 13:51 )   PT: 10.9 sec;   INR: 0.90          PTT - ( 05 Apr 2021 13:51 )  PTT:26.3 sec    04-05    142  |  102  |  15  ----------------------------<  75  4.0   |  31  |  0.80    Ca    9.8      05 Apr 2021 13:51    TPro  7.6  /  Alb  3.9  /  TBili  0.2  /  DBili  x   /  AST  30  /  ALT  17  /  AlkPhos  56  04-05    CARDIAC MARKERS ( 05 Apr 2021 13:51 )  x     / 0.02 ng/mL / x     / x     / x

## 2021-04-05 NOTE — ED ADULT NURSE NOTE - CHIEF COMPLAINT QUOTE
biba from [facility] c/o midsternal chest pressure radiating to back worst with exertion x 5 days.  Given nitro x 1 at [facility] with mild relief.  Hx HTN

## 2021-04-05 NOTE — H&P ADULT - PROBLEM SELECTOR PLAN 5
On Pepcid at home, c/w Pantoprazole 40mg QD as GI ppx    VTE: Lovenox  Dispo: NPO for CCTA in AM. Anticipate d/c home in 24-48 hours w/ no needs

## 2021-04-05 NOTE — ED PROVIDER NOTE - OBJECTIVE STATEMENT
65 y/o Female with Shellfish allergy and Statin intolerance (myalgias) and a PMHx of HTN, RA, Asthma, Lupus on plaquenil, here for evaluation from [facility] for exertional CP and SOB x 5 days. improves with rest. 10/10 with exertion and radiates into R arm and chest. no peripheral edema, calf pain/tenderness.   of note pt was admitted for r/o ACS in 10/2019, s/p Cardiac Catheterization 10/27/2019: normal coronary arteries, normal LV function, EF 65%, LVEDP 9, Right Radial access. Echocardiogram 10/28/2019: EF: 65.0 %, trace MS/TR, mild MR, Pulmonary artery systolic pressure (estimated using the tricuspid regurgitant gradient and an estimate of right atrial pressure) is 28.6 mmHg. also had CT angio neg for PE as had elevated d dimer 383  denies hx of pericarditis/myocarditis. states pain worsens with laying flat, improves sitting up  denies hx of covid 19 or any exposure  given nitro which improved pain

## 2021-04-05 NOTE — H&P ADULT - HISTORY OF PRESENT ILLNESS
65 y/o Female with Shellfish allergy (anaphylaxis, received premedication for contrast in 2019) and Statin intolerance (myalgias) and a PMHx of HTN, GERD, RA and/or Lupus (on daily Plaquenil and Medrol),     ere for evaluation from [facility] for exertional CP and SOB x 5 days. improves with rest. 10/10 with exertion and radiates into R arm and chest. no peripheral edema, calf pain/tenderness.   	of note pt was admitted for r/o ACS in 10/2019, s/p Cardiac Catheterization 10/27/2019: normal coronary arteries, normal LV function, EF 65%, LVEDP 9, Right Radial access. Echocardiogram 10/28/2019: EF: 65.0 %, trace [place]/TR, mild MR, Pulmonary artery systolic pressure (estimated using the tricuspid regurgitant gradient and an estimate of right atrial pressure) is 28.6 mmHg. also had CT angio neg for PE as had elevated d dimer 383  	denies hx of pericarditis/myocarditis. states pain worsens with laying flat, improves sitting up  	denies hx of covid 19 or any exposure  given nitro which improved pain 67 y/o Female with Shellfish allergy (anaphylaxis, received premedication for contrast in 2019) and Statin intolerance (myalgias) and FamHx of CVA (sister), CHF (mother), PMHx of HTN, GERD, RA and/or Lupus (on daily Plaquenil and Medrol), who presents to [facility] ED on 4/5/21 c/o substernal, non-radiating, sharp, chest pressure, worsened with ambulation of 1 block, improved with rest with associated fatigue and WYATT. Her CP is reproducible on palpation and states she "feels like she is in quicksand when lying flat". She reports she was in her usual state of health prior to last week, but notes she had stopped taking her Plaquenil/Medrol in preparation for 7 days for her 2nd Covid vaccine (Pfizer, 10 days ago). Pt notes she also had an RA flair last week. She denies fevers, chills, cough, HA, dizziness, syncope, palpitations, orthopnea, PND, n/v/d, melena, BRBPR, dysuria, LE edema, or any other symptoms at this time.   Of note, pt had Cardiac Catheterization at [facility] on 10/27/2019: normal coronary arteries, normal LV function, EF 65%, LVEDP 9, Right Radial access. Echocardiogram 10/28/2019: EF: 65%, trace NV/TR, mild MR. Pt also had CTA Chest 10/26/10 negative for PE which had been performed for D-Dimer 383 at that time. Pt states she has not had cardiac workup since that admission.  On arrival to ED, VS noted as T 98, HR 66, /75, RR 17, SpO2 98% RA. Labs significant for Trop 0.02, BNP 48, D-Dimer 344. CXR wet read negative per ED attending (f/u official report). EKG SB @ 59 BPM, mild TWI in V2-V3, low voltage. Pt is now admitted for further management of chest pain.   67 y/o Female with Shellfish allergy (anaphylaxis, received premedication for contrast in 2019) and Statin intolerance (myalgias) and FamHx of CVA (sister), CHF (mother), PMHx of HTN, GERD, RA and/or Lupus (on daily Plaquenil and Medrol), who presents to [facility] ED on 4/5/21 c/o substernal, non-radiating, sharp, chest pressure, worsened with ambulation of 1 block, improved with rest with associated fatigue and WYATT. Pt was given Nitro SLG x 1 at [facility] before presenting to ED with mild relief of CP. She is currently CP free and her CP is reproducible on palpation. She also states she "feels like she is in quicksand when lying flat". She reports she was in her usual state of health prior to last week, but notes she had stopped taking her Plaquenil/Medrol in preparation for 7 days for her 2nd Covid vaccine (Pfizer, 10 days ago). Pt notes she also had an RA flair last week. She denies fevers, chills, cough, HA, dizziness, syncope, palpitations, orthopnea, PND, n/v/d, melena, BRBPR, dysuria, LE edema, or any other symptoms at this time.   Of note, pt had Cardiac Catheterization at [facility] on 10/27/2019: normal coronary arteries, normal LV function, EF 65%, LVEDP 9, Right Radial access. Echocardiogram 10/28/2019: EF: 65%, trace W[place]/TR, mild MR. Pt also had CTA Chest 10/26/10 negative for PE which had been performed for D-Dimer 383 at that time. Pt states she has not had cardiac workup since that admission.  On arrival to ED, VS noted as T 98, HR 66, /75, RR 17, SpO2 98% RA. Labs significant for Trop 0.02, BNP 48, D-Dimer 344. CXR wet read negative per ED attending (f/u official report). EKG SB @ 59 BPM, mild TWI in V2-V3, low voltage. Pt is now admitted for further management of chest pain.

## 2021-04-05 NOTE — H&P ADULT - NSICDXFAMILYHX_GEN_ALL_CORE_FT
FAMILY HISTORY:  Mother  Still living? Yes, Estimated age: Age Unknown  FH: heart failure, Age at diagnosis: Age Unknown    Sibling  Still living? Yes, Estimated age: Age Unknown  Family history of cerebrovascular accident (CVA) in sister, Age at diagnosis: Age Unknown

## 2021-04-05 NOTE — H&P ADULT - RS GEN PE MLT RESP DETAILS PC
good air movement/respirations non-labored/clear to auscultation bilaterally/no chest wall tenderness/no intercostal retractions/no rhonchi/no wheezes

## 2021-04-05 NOTE — ED ADULT TRIAGE NOTE - CHIEF COMPLAINT QUOTE
biba from [facility] c/o midsternal chest pressure radiating to back worst with exertion x 5 days.  Given nitro x 1 at [facility] with mild relief.  Hx HTN

## 2021-04-05 NOTE — ED PROVIDER NOTE - PHYSICAL EXAMINATION
CONSTITUTIONAL: Well-appearing; well-nourished; in no apparent distress.   HEAD: Normocephalic; atraumatic.   EYES:  conjunctiva and sclera clear  ENT: normal nose; no rhinorrhea; normal pharynx with no erythema or lesions.   NECK: Supple; non-tender;   CARDIOVASCULAR: Normal S1, S2; no obvious murmurs, rubs, or gallops (limited by loud ED). Regular rate and rhythm.   RESPIRATORY: Breathing easily; breath sounds clear and equal bilaterally; no wheezes, rhonchi, or rales.  GI: Soft; non-distended; non-tender; no palpable organomegaly.   EXT: No cyanosis or edema; N/V intact  SKIN: Normal for age and race; warm; dry; good turgor; no apparent lesions or rash.   NEURO: A & O x 3; face symmetric; grossly unremarkable.   PSYCHOLOGICAL: The patient’s mood and manner are appropriate.

## 2021-04-05 NOTE — H&P ADULT - PROBLEM SELECTOR PLAN 1
Currently CP free and HD stable. Trop 0.02 (previously peaked at 0.10 prior to dx cath revealing normal coronaries in 2019). EKG SB w/ mild TWI in V2-V3  - F/u cardiac enzymes and EKGs at 6PM and 10PM. F/u ESR/CRP  - NPO after midnight for CCTA. Premedication for shellfish allergy (anaphylaxis) ordered with Prednisone 50mg PO x 1 at 6PM, 12AM, 6AM prior to CCTA. PLEASE ORDER BENADRYL 50MG IV X 1 ONE HOUR PRIOR TO CCTA.   - Echo ordered, f/u results  - Started ASA 650mg PO TID (x7 days) and Colchicine 0.6mg BID (x3 months) for ?pericarditis vs myocarditis in light of known autoimmune dz and atypical CP  - D-Dimer 344, very low suspicion of PE given satting well on RA, not tachycardic or tachypneic and CTA Chest negative for PE in 2019 when D-Dimer was higher

## 2021-04-05 NOTE — ED ADULT NURSE NOTE - OBJECTIVE STATEMENT
Pt AOX4. Pt c/o midsternal chest pain and SOB for 1 week while ambulating. Pt denies fevers, chills, n/v, dizziness, weakness, numbness. Pt speaking in full complete sentences. Respirations even and unlabored. Pt denies chest pain upon arrival to ED. Pt given 1 nitro at [facility] with chest pain relief.

## 2021-04-06 ENCOUNTER — TRANSCRIPTION ENCOUNTER (OUTPATIENT)
Age: 66
End: 2021-04-06

## 2021-04-06 VITALS
RESPIRATION RATE: 18 BRPM | DIASTOLIC BLOOD PRESSURE: 67 MMHG | SYSTOLIC BLOOD PRESSURE: 144 MMHG | HEART RATE: 70 BPM | OXYGEN SATURATION: 98 %

## 2021-04-06 LAB
ANION GAP SERPL CALC-SCNC: 11 MMOL/L — SIGNIFICANT CHANGE UP (ref 5–17)
ANISOCYTOSIS BLD QL: SLIGHT — SIGNIFICANT CHANGE UP
BASOPHILS # BLD AUTO: 0 K/UL — SIGNIFICANT CHANGE UP (ref 0–0.2)
BASOPHILS NFR BLD AUTO: 0 % — SIGNIFICANT CHANGE UP (ref 0–2)
BUN SERPL-MCNC: 19 MG/DL — SIGNIFICANT CHANGE UP (ref 7–23)
CALCIUM SERPL-MCNC: 9.7 MG/DL — SIGNIFICANT CHANGE UP (ref 8.4–10.5)
CHLORIDE SERPL-SCNC: 103 MMOL/L — SIGNIFICANT CHANGE UP (ref 96–108)
CHOLEST SERPL-MCNC: 216 MG/DL — HIGH
CO2 SERPL-SCNC: 24 MMOL/L — SIGNIFICANT CHANGE UP (ref 22–31)
COVID-19 SPIKE DOMAIN AB INTERP: POSITIVE
COVID-19 SPIKE DOMAIN ANTIBODY RESULT: >250 U/ML — HIGH
CREAT SERPL-MCNC: 0.76 MG/DL — SIGNIFICANT CHANGE UP (ref 0.5–1.3)
EOSINOPHIL # BLD AUTO: 0 K/UL — SIGNIFICANT CHANGE UP (ref 0–0.5)
EOSINOPHIL NFR BLD AUTO: 0 % — SIGNIFICANT CHANGE UP (ref 0–6)
GIANT PLATELETS BLD QL SMEAR: PRESENT — SIGNIFICANT CHANGE UP
GLUCOSE SERPL-MCNC: 146 MG/DL — HIGH (ref 70–99)
HCT VFR BLD CALC: 36.3 % — SIGNIFICANT CHANGE UP (ref 34.5–45)
HDLC SERPL-MCNC: 56 MG/DL — SIGNIFICANT CHANGE UP
HGB BLD-MCNC: 11.2 G/DL — LOW (ref 11.5–15.5)
LIPID PNL WITH DIRECT LDL SERPL: 146 MG/DL — HIGH
LYMPHOCYTES # BLD AUTO: 0.31 K/UL — LOW (ref 1–3.3)
LYMPHOCYTES # BLD AUTO: 8.7 % — LOW (ref 13–44)
MACROCYTES BLD QL: SLIGHT — SIGNIFICANT CHANGE UP
MAGNESIUM SERPL-MCNC: 2.2 MG/DL — SIGNIFICANT CHANGE UP (ref 1.6–2.6)
MANUAL SMEAR VERIFICATION: SIGNIFICANT CHANGE UP
MCHC RBC-ENTMCNC: 28.9 PG — SIGNIFICANT CHANGE UP (ref 27–34)
MCHC RBC-ENTMCNC: 30.9 GM/DL — LOW (ref 32–36)
MCV RBC AUTO: 93.8 FL — SIGNIFICANT CHANGE UP (ref 80–100)
MONOCYTES # BLD AUTO: 0.03 K/UL — SIGNIFICANT CHANGE UP (ref 0–0.9)
MONOCYTES NFR BLD AUTO: 0.9 % — LOW (ref 2–14)
NEUTROPHILS # BLD AUTO: 3.14 K/UL — SIGNIFICANT CHANGE UP (ref 1.8–7.4)
NEUTROPHILS NFR BLD AUTO: 89.5 % — HIGH (ref 43–77)
NON HDL CHOLESTEROL: 160 MG/DL — HIGH
OVALOCYTES BLD QL SMEAR: SLIGHT — SIGNIFICANT CHANGE UP
PLAT MORPH BLD: ABNORMAL
PLATELET # BLD AUTO: 337 K/UL — SIGNIFICANT CHANGE UP (ref 150–400)
POIKILOCYTOSIS BLD QL AUTO: SLIGHT — SIGNIFICANT CHANGE UP
POLYCHROMASIA BLD QL SMEAR: SLIGHT — SIGNIFICANT CHANGE UP
POTASSIUM SERPL-MCNC: 4.3 MMOL/L — SIGNIFICANT CHANGE UP (ref 3.5–5.3)
POTASSIUM SERPL-SCNC: 4.3 MMOL/L — SIGNIFICANT CHANGE UP (ref 3.5–5.3)
RBC # BLD: 3.87 M/UL — SIGNIFICANT CHANGE UP (ref 3.8–5.2)
RBC # FLD: 12.8 % — SIGNIFICANT CHANGE UP (ref 10.3–14.5)
RBC BLD AUTO: ABNORMAL
SARS-COV-2 IGG+IGM SERPL QL IA: >250 U/ML — HIGH
SARS-COV-2 IGG+IGM SERPL QL IA: POSITIVE
SODIUM SERPL-SCNC: 138 MMOL/L — SIGNIFICANT CHANGE UP (ref 135–145)
SPHEROCYTES BLD QL SMEAR: SLIGHT — SIGNIFICANT CHANGE UP
TRIGL SERPL-MCNC: 72 MG/DL — SIGNIFICANT CHANGE UP
VARIANT LYMPHS # BLD: 0.9 % — SIGNIFICANT CHANGE UP (ref 0–6)
WBC # BLD: 3.51 K/UL — LOW (ref 3.8–10.5)
WBC # FLD AUTO: 3.51 K/UL — LOW (ref 3.8–10.5)

## 2021-04-06 PROCEDURE — 99239 HOSP IP/OBS DSCHRG MGMT >30: CPT

## 2021-04-06 PROCEDURE — 82553 CREATINE MB FRACTION: CPT

## 2021-04-06 PROCEDURE — 84484 ASSAY OF TROPONIN QUANT: CPT

## 2021-04-06 PROCEDURE — 83880 ASSAY OF NATRIURETIC PEPTIDE: CPT

## 2021-04-06 PROCEDURE — 85730 THROMBOPLASTIN TIME PARTIAL: CPT

## 2021-04-06 PROCEDURE — 85025 COMPLETE CBC W/AUTO DIFF WBC: CPT

## 2021-04-06 PROCEDURE — 80061 LIPID PANEL: CPT

## 2021-04-06 PROCEDURE — U0003: CPT

## 2021-04-06 PROCEDURE — 36415 COLL VENOUS BLD VENIPUNCTURE: CPT

## 2021-04-06 PROCEDURE — 80053 COMPREHEN METABOLIC PANEL: CPT

## 2021-04-06 PROCEDURE — 75574 CT ANGIO HRT W/3D IMAGE: CPT

## 2021-04-06 PROCEDURE — 85610 PROTHROMBIN TIME: CPT

## 2021-04-06 PROCEDURE — 99285 EMERGENCY DEPT VISIT HI MDM: CPT | Mod: 25

## 2021-04-06 PROCEDURE — 82550 ASSAY OF CK (CPK): CPT

## 2021-04-06 PROCEDURE — 94640 AIRWAY INHALATION TREATMENT: CPT

## 2021-04-06 PROCEDURE — 80048 BASIC METABOLIC PNL TOTAL CA: CPT

## 2021-04-06 PROCEDURE — 93306 TTE W/DOPPLER COMPLETE: CPT | Mod: 26

## 2021-04-06 PROCEDURE — 96372 THER/PROPH/DIAG INJ SC/IM: CPT

## 2021-04-06 PROCEDURE — 83036 HEMOGLOBIN GLYCOSYLATED A1C: CPT

## 2021-04-06 PROCEDURE — 85379 FIBRIN DEGRADATION QUANT: CPT

## 2021-04-06 PROCEDURE — 75574 CT ANGIO HRT W/3D IMAGE: CPT | Mod: 26

## 2021-04-06 PROCEDURE — 84443 ASSAY THYROID STIM HORMONE: CPT

## 2021-04-06 PROCEDURE — 93306 TTE W/DOPPLER COMPLETE: CPT

## 2021-04-06 PROCEDURE — 71250 CT THORAX DX C-: CPT | Mod: XU

## 2021-04-06 PROCEDURE — 85652 RBC SED RATE AUTOMATED: CPT

## 2021-04-06 PROCEDURE — 93005 ELECTROCARDIOGRAM TRACING: CPT

## 2021-04-06 PROCEDURE — 96374 THER/PROPH/DIAG INJ IV PUSH: CPT

## 2021-04-06 PROCEDURE — U0005: CPT

## 2021-04-06 PROCEDURE — 71046 X-RAY EXAM CHEST 2 VIEWS: CPT

## 2021-04-06 PROCEDURE — 83735 ASSAY OF MAGNESIUM: CPT

## 2021-04-06 PROCEDURE — 86140 C-REACTIVE PROTEIN: CPT

## 2021-04-06 PROCEDURE — 86769 SARS-COV-2 COVID-19 ANTIBODY: CPT

## 2021-04-06 RX ORDER — COLCHICINE 0.6 MG
1 TABLET ORAL
Qty: 60 | Refills: 2
Start: 2021-04-06 | End: 2021-07-04

## 2021-04-06 RX ORDER — DIPHENHYDRAMINE HCL 50 MG
50 CAPSULE ORAL ONCE
Refills: 0 | Status: COMPLETED | OUTPATIENT
Start: 2021-04-06 | End: 2021-04-06

## 2021-04-06 RX ORDER — POLYETHYLENE GLYCOL 3350 17 G/17G
17 POWDER, FOR SOLUTION ORAL
Qty: 119 | Refills: 0
Start: 2021-04-06 | End: 2021-04-12

## 2021-04-06 RX ORDER — ASPIRIN/CALCIUM CARB/MAGNESIUM 324 MG
2 TABLET ORAL
Qty: 42 | Refills: 0
Start: 2021-04-06 | End: 2021-04-12

## 2021-04-06 RX ORDER — PANTOPRAZOLE SODIUM 20 MG/1
1 TABLET, DELAYED RELEASE ORAL
Qty: 30 | Refills: 0
Start: 2021-04-06 | End: 2021-05-05

## 2021-04-06 RX ADMIN — BUDESONIDE AND FORMOTEROL FUMARATE DIHYDRATE 2 PUFF(S): 160; 4.5 AEROSOL RESPIRATORY (INHALATION) at 09:53

## 2021-04-06 RX ADMIN — PANTOPRAZOLE SODIUM 40 MILLIGRAM(S): 20 TABLET, DELAYED RELEASE ORAL at 06:35

## 2021-04-06 RX ADMIN — Medication 200 MILLIGRAM(S): at 06:34

## 2021-04-06 RX ADMIN — Medication 50 MILLIGRAM(S): at 09:34

## 2021-04-06 RX ADMIN — Medication 650 MILLIGRAM(S): at 14:30

## 2021-04-06 RX ADMIN — Medication 50 MILLIGRAM(S): at 06:34

## 2021-04-06 RX ADMIN — VALSARTAN 80 MILLIGRAM(S): 80 TABLET ORAL at 06:34

## 2021-04-06 RX ADMIN — Medication 650 MILLIGRAM(S): at 06:35

## 2021-04-06 RX ADMIN — Medication 0.6 MILLIGRAM(S): at 06:35

## 2021-04-06 RX ADMIN — Medication 4 MILLIGRAM(S): at 06:34

## 2021-04-06 NOTE — DISCHARGE NOTE PROVIDER - NSDCCPCAREPLAN_GEN_ALL_CORE_FT
PRINCIPAL DISCHARGE DIAGNOSIS  Diagnosis: Pericarditis  Assessment and Plan of Treatment:        PRINCIPAL DISCHARGE DIAGNOSIS  Diagnosis: Pericarditis  Assessment and Plan of Treatment: - You came to the hospital with complaintas of chest pain. Blood work was drawn and an EKG was performed and you DID NOT have a heart attack. A CT scan of your coronary arteries was performed which showed MINIMAL disease in 2 of the arteries. An echocardiogram or ultras       PRINCIPAL DISCHARGE DIAGNOSIS  Diagnosis: Pericarditis  Assessment and Plan of Treatment: - You came to the hospital with complaintas of chest pain. Blood work was drawn and an EKG was performed and you DID NOT have a heart attack. A CT scan of your coronary arteries was performed which showed MINIMAL disease in 2 of the arteries. An echocardiogram or ultrasound of your heart was performed which showed   - Your pain was most likely caused by pericarditis. Pericarditis is a condition that causes irritation of the tissue around your heart. This tissue (called the pericardium) forms a sac that protects your heart and keeps it from rubbing against nearby organs.   - You will continue Aspirin 650mg TID for 7 days and Colchicine 0.6mg twice a day for 3 months. Please follow up with       PRINCIPAL DISCHARGE DIAGNOSIS  Diagnosis: Pericarditis  Assessment and Plan of Treatment: - You came to the hospital with complaintas of chest pain. Blood work was drawn and an EKG was performed and you DID NOT have a heart attack. A CT scan of your coronary arteries was performed which showed MINIMAL disease in 2 of the arteries. An echocardiogram or ultrasound of your heart was performed which showed a normal ejection fraction or pumping function and no significant valvular disease.   - Your pain was most likely caused by pericarditis. Pericarditis is a condition that causes irritation of the tissue around your heart. This tissue (called the pericardium) forms a sac that protects your heart and keeps it from rubbing against nearby organs.   - You will continue Aspirin 650mg TID for 7 days and Colchicine 0.6mg twice a day for 3 months. Please call and schedule an appointment to follow up with Dr. Vera within one month of discharge from the hospital.       PRINCIPAL DISCHARGE DIAGNOSIS  Diagnosis: Pericarditis  Assessment and Plan of Treatment: - You came to the hospital with complaintas of chest pain. Blood work was drawn and an EKG was performed and you DID NOT have a heart attack. A CT scan of your coronary arteries was performed which showed MINIMAL disease in 2 of the arteries. An echocardiogram or ultrasound of your heart was performed which showed a normal ejection fraction or pumping function and no significant valvular disease.   - Your pain was most likely caused by pericarditis. Pericarditis is a condition that causes irritation of the tissue around your heart. This tissue (called the pericardium) forms a sac that protects your heart and keeps it from rubbing against nearby organs.   - You will continue Aspirin 650mg TID for 7 days and Colchicine 0.6mg twice a day for 3 months. These medications will help reduce the pain and inflammation.   - Please call and schedule an appointment to follow up with Dr. Vera within one month of discharge from the hospital.       PRINCIPAL DISCHARGE DIAGNOSIS  Diagnosis: Chest pain, unspecified type  Assessment and Plan of Treatment: - You came to the hospital with complaints of chest pain. Blood work was drawn and an EKG was performed and you DID NOT have a heart attack. A CT scan of your coronary arteries was performed which showed MINIMAL disease in 2 of the arteries. An echocardiogram or ultrasound of your heart was performed which showed a normal ejection fraction or pumping function and no significant valvular disease.   - Your pain was most likely caused by pericarditis versus. Costochondritis. Pericarditis is a condition that causes irritation of the tissue around your heart. This tissue (called the pericardium) forms a sac that protects your heart and keeps it from rubbing against nearby organs.  Costochondritis is a condition that causes pain and tenderness in your chest. The pain happens in an area called the costosternal joints, where the ribs meet the breastbone. The pain from costochondritis affects only a small area and does not always get worse when you move around, which may be caused by your Lupus.   - You will continue Aspirin 650mg TID for 7 days and Colchicine 0.6mg twice a day for 3 months. These medications will help reduce the pain and inflammation.   - Please call and schedule an appointment to follow up with Dr. Vera within one month of discharge from the hospital.

## 2021-04-06 NOTE — DISCHARGE NOTE PROVIDER - NSDCMRMEDTOKEN_GEN_ALL_CORE_FT
Advair Diskus:  inhaled 2 times a day  Diovan 80 mg oral capsule: 1 tab(s) orally once a day   hydroxychloroquine 200 mg oral tablet: 1 tab(s) orally 2 times a day  methylPREDNISolone 4 mg oral tablet: 1 tab(s) orally once a day  Prevacid:  orally   Proventil:  inhaled , As Needed  Zetia 10 mg oral tablet: 1 tab(s) orally once a day (at bedtime)    Advair Diskus:  inhaled 2 times a day  aspirin 325 mg oral delayed release tablet: 2 tab(s) orally 3 times a day. STOP AFTER 7 DAYS  colchicine 0.6 mg oral tablet: 1 tab(s) orally 2 times a day  Diovan 80 mg oral capsule: 1 tab(s) orally once a day   hydroxychloroquine 200 mg oral tablet: 1 tab(s) orally 2 times a day  methylPREDNISolone 4 mg oral tablet: 1 tab(s) orally once a day  MiraLax oral powder for reconstitution: 17 gram(s) orally once a day   pantoprazole 40 mg oral delayed release tablet: 1 tab(s) orally once a day (before a meal)  Proventil:  inhaled , As Needed  Zetia 10 mg oral tablet: 1 tab(s) orally once a day (at bedtime)

## 2021-04-06 NOTE — DISCHARGE NOTE NURSING/CASE MANAGEMENT/SOCIAL WORK - PATIENT PORTAL LINK FT
You can access the FollowMyHealth Patient Portal offered by [facility] by registering at the following website: [email or web address]. By joining [employer]’s FollowMyHealth portal, you will also be able to view your health information using other applications (apps) compatible with our system.

## 2021-04-06 NOTE — DISCHARGE NOTE PROVIDER - CARE PROVIDER_API CALL
Jimmy Vera (MD)  Cardiovascular Disease; Internal Medicine  Cardiology [facility], [address] [place] [address]  Phone: [phone]  Fax: [phone]  Follow Up Time: 1 month

## 2021-04-06 NOTE — DISCHARGE NOTE PROVIDER - HOSPITAL COURSE
66 y/oF with Shellfish allergy (anaphylaxis, received premedication for contrast in 2019) and Statin intolerance (myalgias) and FamHx of CVA (sister), CHF (mother), PMHx of HTN, GERD, RA and/or Lupus (on daily Plaquenil and Medrol), presented to ED 4/5/21 w/c/o substernal, non-radiating chest pressure worse with ambulation and improved with rest. Of note, pt had Cardiac Catheterization at [facility] on 10/27/2019: normal coronary arteries, normal LV function, EF 65%, LVEDP 9, Right Radial access. Echocardiogram 10/28/2019: EF: 65%, trace PA/TR, mild MR. Pt also had CTA Chest 10/26/20 negative for PE which had been performed for D-Dimer 383 at that time. Pt states she has not had cardiac workup since that admissio. On arrival to ED, VS noted as T 98, HR 66, /75, RR 17, SpO2 98% RA. Labs significant for Trop 0.02, BNP 48, D-Dimer 344.  EKG SB @ 59 BPM, mild TWI in V2-V3, low voltage. Pt admitted for further management of chest pain.    During hospitalization, Trop peaked 0.02. Cardiac CTA performed s/f calcium score 86 and minimal calcific disease in LMCA and prox RCA, otherwise normal coronary arteries. ECHO performed s.f       Chest pressure most likely secondary to pericarditis. On the day of discharge, the patient was seen and examined. Symptoms improved. Vital signs are stable. Labs and imaging reviewed. Patient is medically optimized and hemodynamically stable. Return precautions discussed, medication teach back done w/ patient, and importance of physician followup emphasized for which she verbalized understanding.     DC meds:   ASA 650mg TID x 7 days   Colchicine 0.6mg BID x 3 months   Protonix 40mg x 7 days  Valsartan 80mg        66 y/oF with Shellfish allergy (anaphylaxis, received premedication for contrast in 2019) and Statin intolerance (myalgias) and FamHx of CVA (sister), CHF (mother), PMHx of HTN, GERD, RA and/or Lupus (on daily Plaquenil and Medrol), presented to ED 4/5/21 w/c/o substernal, non-radiating chest pressure worse with ambulation and improved with rest. Of note, pt had Cardiac Catheterization at [facility] on 10/27/2019: normal coronary arteries, normal LV function, EF 65%, LVEDP 9, Right Radial access. Echocardiogram 10/28/2019: EF: 65%, trace CT/TR, mild MR. Pt also had CTA Chest 10/26/20 negative for PE which had been performed for D-Dimer 383 at that time. Pt states she has not had cardiac workup since that admissio. On arrival to ED, VS noted as T 98, HR 66, /75, RR 17, SpO2 98% RA. Labs significant for Trop 0.02, BNP 48, D-Dimer 344.  EKG SB @ 59 BPM, mild TWI in V2-V3, low voltage. Pt admitted for further management of chest pain.    During hospitalization, Trop peaked 0.02. Cardiac CTA performed s/f calcium score 86 and minimal calcific disease in LMCA and prox RCA, otherwise normal coronary arteries. ECHO performed s.f  Normal left and right ventricular size and systolic function and No significant valvular disease. Chest pressure most likely secondary to pericarditis. On the day of discharge, the patient was seen and examined. Symptoms improved. Vital signs are stable. Labs and imaging reviewed. Patient is medically optimized and hemodynamically stable. Return precautions discussed, medication teach back done w/ patient, and importance of physician followup emphasized for which she verbalized understanding.     DC meds:   ASA 650mg TID x 7 days   Colchicine 0.6mg BID x 3 months   Protonix 40mg x 7 days  Valsartan 80mg        66 y/oF with Shellfish allergy (anaphylaxis, received premedication for contrast in 2019) and Statin intolerance (myalgias) and FamHx of CVA (sister), CHF (mother), PMHx of HTN, GERD, RA and/or Lupus (on daily Plaquenil and Medrol), presented to ED 4/5/21 w/c/o substernal, non-radiating chest pressure worse with ambulation and improved with rest. Of note, pt had Cardiac Catheterization at [facility] on 10/27/2019: normal coronary arteries, normal LV function, EF 65%, LVEDP 9, Right Radial access. Echocardiogram 10/28/2019: EF: 65%, trace [place]/TR, mild MR. Pt also had CTA Chest 10/26/20 negative for PE which had been performed for D-Dimer 383 at that time. Pt states she has not had cardiac workup since that admissio. On arrival to ED, VS noted as T 98, HR 66, /75, RR 17, SpO2 98% RA. Labs significant for Trop 0.02, BNP 48, D-Dimer 344.  EKG SB @ 59 BPM, mild TWI in V2-V3, low voltage. Pt admitted for further management of chest pain.    During hospitalization, Trop peaked 0.02. Cardiac CTA performed s/f calcium score 86 and minimal calcific disease in LMCA and prox RCA, otherwise normal coronary arteries. ECHO performed s.f  Normal left and right ventricular size and systolic function and No significant valvular disease. Chest pressure most likely secondary to pericarditis vs. Costochondritis. On the day of discharge, the patient was seen and examined. Symptoms improved. Vital signs are stable. Labs and imaging reviewed. Patient is medically optimized and hemodynamically stable. Return precautions discussed, medication teach back done w/ patient, and importance of physician followup emphasized for which she verbalized understanding.     DC meds:   ASA 650mg TID x 7 days   Colchicine 0.6mg BID x 3 months   Protonix 40mg x 7 days  Valsartan 80mg

## 2021-12-30 ENCOUNTER — EMERGENCY (EMERGENCY)
Facility: HOSPITAL | Age: 66
LOS: 1 days | Discharge: HOME | End: 2021-12-30
Attending: EMERGENCY MEDICINE | Admitting: EMERGENCY MEDICINE
Payer: MEDICARE

## 2021-12-30 VITALS
SYSTOLIC BLOOD PRESSURE: 136 MMHG | HEART RATE: 80 BPM | WEIGHT: 145.06 LBS | RESPIRATION RATE: 18 BRPM | TEMPERATURE: 98 F | DIASTOLIC BLOOD PRESSURE: 79 MMHG | HEIGHT: 66 IN | OXYGEN SATURATION: 95 %

## 2021-12-30 VITALS
RESPIRATION RATE: 18 BRPM | SYSTOLIC BLOOD PRESSURE: 150 MMHG | DIASTOLIC BLOOD PRESSURE: 69 MMHG | TEMPERATURE: 97 F | HEART RATE: 88 BPM | OXYGEN SATURATION: 98 %

## 2021-12-30 DIAGNOSIS — Z91.013 ALLERGY TO SEAFOOD: ICD-10-CM

## 2021-12-30 DIAGNOSIS — I10 ESSENTIAL (PRIMARY) HYPERTENSION: ICD-10-CM

## 2021-12-30 DIAGNOSIS — R07.89 OTHER CHEST PAIN: ICD-10-CM

## 2021-12-30 DIAGNOSIS — Z88.0 ALLERGY STATUS TO PENICILLIN: ICD-10-CM

## 2021-12-30 DIAGNOSIS — Z79.82 LONG TERM (CURRENT) USE OF ASPIRIN: ICD-10-CM

## 2021-12-30 DIAGNOSIS — U07.1 COVID-19: ICD-10-CM

## 2021-12-30 DIAGNOSIS — Z90.49 ACQUIRED ABSENCE OF OTHER SPECIFIED PARTS OF DIGESTIVE TRACT: Chronic | ICD-10-CM

## 2021-12-30 LAB
ALBUMIN SERPL ELPH-MCNC: 4 G/DL — SIGNIFICANT CHANGE UP (ref 3.3–5)
ALBUMIN SERPL ELPH-MCNC: 4.1 G/DL — SIGNIFICANT CHANGE UP (ref 3.3–5)
ALP SERPL-CCNC: 54 U/L — SIGNIFICANT CHANGE UP (ref 40–120)
ALP SERPL-CCNC: 57 U/L — SIGNIFICANT CHANGE UP (ref 40–120)
ALT FLD-CCNC: 12 U/L — SIGNIFICANT CHANGE UP (ref 10–45)
ALT FLD-CCNC: SIGNIFICANT CHANGE UP U/L (ref 10–45)
ANION GAP SERPL CALC-SCNC: 13 MMOL/L — SIGNIFICANT CHANGE UP (ref 5–17)
ANION GAP SERPL CALC-SCNC: 9 MMOL/L — SIGNIFICANT CHANGE UP (ref 5–17)
AST SERPL-CCNC: 20 U/L — SIGNIFICANT CHANGE UP (ref 10–40)
AST SERPL-CCNC: SIGNIFICANT CHANGE UP U/L (ref 10–40)
BASOPHILS # BLD AUTO: 0 K/UL — SIGNIFICANT CHANGE UP (ref 0–0.2)
BASOPHILS NFR BLD AUTO: 0 % — SIGNIFICANT CHANGE UP (ref 0–2)
BILIRUB SERPL-MCNC: 0.2 MG/DL — SIGNIFICANT CHANGE UP (ref 0.2–1.2)
BILIRUB SERPL-MCNC: 0.3 MG/DL — SIGNIFICANT CHANGE UP (ref 0.2–1.2)
BUN SERPL-MCNC: 13 MG/DL — SIGNIFICANT CHANGE UP (ref 7–23)
BUN SERPL-MCNC: 14 MG/DL — SIGNIFICANT CHANGE UP (ref 7–23)
CALCIUM SERPL-MCNC: 9.5 MG/DL — SIGNIFICANT CHANGE UP (ref 8.4–10.5)
CALCIUM SERPL-MCNC: 9.5 MG/DL — SIGNIFICANT CHANGE UP (ref 8.4–10.5)
CHLORIDE SERPL-SCNC: 104 MMOL/L — SIGNIFICANT CHANGE UP (ref 96–108)
CHLORIDE SERPL-SCNC: 105 MMOL/L — SIGNIFICANT CHANGE UP (ref 96–108)
CO2 SERPL-SCNC: 23 MMOL/L — SIGNIFICANT CHANGE UP (ref 22–31)
CO2 SERPL-SCNC: 28 MMOL/L — SIGNIFICANT CHANGE UP (ref 22–31)
CREAT SERPL-MCNC: 0.71 MG/DL — SIGNIFICANT CHANGE UP (ref 0.5–1.3)
CREAT SERPL-MCNC: 0.75 MG/DL — SIGNIFICANT CHANGE UP (ref 0.5–1.3)
EOSINOPHIL # BLD AUTO: 0 K/UL — SIGNIFICANT CHANGE UP (ref 0–0.5)
EOSINOPHIL NFR BLD AUTO: 0 % — SIGNIFICANT CHANGE UP (ref 0–6)
GLUCOSE SERPL-MCNC: 156 MG/DL — HIGH (ref 70–99)
GLUCOSE SERPL-MCNC: 195 MG/DL — HIGH (ref 70–99)
HCT VFR BLD CALC: 39.4 % — SIGNIFICANT CHANGE UP (ref 34.5–45)
HGB BLD-MCNC: 12 G/DL — SIGNIFICANT CHANGE UP (ref 11.5–15.5)
IMM GRANULOCYTES NFR BLD AUTO: 0.4 % — SIGNIFICANT CHANGE UP (ref 0–1.5)
LYMPHOCYTES # BLD AUTO: 0.58 K/UL — LOW (ref 1–3.3)
LYMPHOCYTES # BLD AUTO: 11 % — LOW (ref 13–44)
MCHC RBC-ENTMCNC: 28.7 PG — SIGNIFICANT CHANGE UP (ref 27–34)
MCHC RBC-ENTMCNC: 30.5 GM/DL — LOW (ref 32–36)
MCV RBC AUTO: 94.3 FL — SIGNIFICANT CHANGE UP (ref 80–100)
MONOCYTES # BLD AUTO: 0.17 K/UL — SIGNIFICANT CHANGE UP (ref 0–0.9)
MONOCYTES NFR BLD AUTO: 3.2 % — SIGNIFICANT CHANGE UP (ref 2–14)
NEUTROPHILS # BLD AUTO: 4.52 K/UL — SIGNIFICANT CHANGE UP (ref 1.8–7.4)
NEUTROPHILS NFR BLD AUTO: 85.4 % — HIGH (ref 43–77)
NRBC # BLD: 0 /100 WBCS — SIGNIFICANT CHANGE UP (ref 0–0)
PLATELET # BLD AUTO: 312 K/UL — SIGNIFICANT CHANGE UP (ref 150–400)
POTASSIUM SERPL-MCNC: 3.8 MMOL/L — SIGNIFICANT CHANGE UP (ref 3.5–5.3)
POTASSIUM SERPL-MCNC: SIGNIFICANT CHANGE UP MMOL/L (ref 3.5–5.3)
POTASSIUM SERPL-SCNC: 3.8 MMOL/L — SIGNIFICANT CHANGE UP (ref 3.5–5.3)
POTASSIUM SERPL-SCNC: SIGNIFICANT CHANGE UP MMOL/L (ref 3.5–5.3)
PROT SERPL-MCNC: 7.1 G/DL — SIGNIFICANT CHANGE UP (ref 6–8.3)
PROT SERPL-MCNC: 7.2 G/DL — SIGNIFICANT CHANGE UP (ref 6–8.3)
RBC # BLD: 4.18 M/UL — SIGNIFICANT CHANGE UP (ref 3.8–5.2)
RBC # FLD: 13 % — SIGNIFICANT CHANGE UP (ref 10.3–14.5)
SARS-COV-2 RNA SPEC QL NAA+PROBE: POSITIVE
SODIUM SERPL-SCNC: 141 MMOL/L — SIGNIFICANT CHANGE UP (ref 135–145)
SODIUM SERPL-SCNC: 141 MMOL/L — SIGNIFICANT CHANGE UP (ref 135–145)
WBC # BLD: 5.29 K/UL — SIGNIFICANT CHANGE UP (ref 3.8–10.5)
WBC # FLD AUTO: 5.29 K/UL — SIGNIFICANT CHANGE UP (ref 3.8–10.5)

## 2021-12-30 PROCEDURE — 93005 ELECTROCARDIOGRAM TRACING: CPT

## 2021-12-30 PROCEDURE — 99283 EMERGENCY DEPT VISIT LOW MDM: CPT | Mod: 25

## 2021-12-30 PROCEDURE — 83690 ASSAY OF LIPASE: CPT

## 2021-12-30 PROCEDURE — 80053 COMPREHEN METABOLIC PANEL: CPT

## 2021-12-30 PROCEDURE — 84484 ASSAY OF TROPONIN QUANT: CPT

## 2021-12-30 PROCEDURE — 85025 COMPLETE CBC W/AUTO DIFF WBC: CPT

## 2021-12-30 PROCEDURE — 71045 X-RAY EXAM CHEST 1 VIEW: CPT

## 2021-12-30 PROCEDURE — 87635 SARS-COV-2 COVID-19 AMP PRB: CPT

## 2021-12-30 PROCEDURE — 36415 COLL VENOUS BLD VENIPUNCTURE: CPT

## 2021-12-30 PROCEDURE — 99284 EMERGENCY DEPT VISIT MOD MDM: CPT | Mod: CS

## 2021-12-30 PROCEDURE — 71045 X-RAY EXAM CHEST 1 VIEW: CPT | Mod: 26

## 2021-12-30 RX ORDER — ALBUTEROL 90 UG/1
2 AEROSOL, METERED ORAL
Qty: 56 | Refills: 0
Start: 2021-12-30 | End: 2022-01-05

## 2021-12-30 NOTE — ED ADULT NURSE NOTE - OBJECTIVE STATEMENT
Pt AOX4. Pt c/o L sided chest pain with associated SOB that started this morning. Pt reports testing positive today for covid. Pt denies fevers, chills, n/v, weakness, numbness, tingling. Pt denies cardiac hx. Pt well appearing. Pt speaking in full complete sentences. Respirations even and unlabored.

## 2021-12-30 NOTE — ED PROVIDER NOTE - CLINICAL SUMMARY MEDICAL DECISION MAKING FREE TEXT BOX
65 y/o F with chest pain and covid +, will check labs, CXR, EKG, ambulatory sat, and reassess. Will give phone number for [facility].

## 2021-12-30 NOTE — ED PROVIDER NOTE - PHYSICAL EXAMINATION
VITAL SIGNS: I have reviewed nursing notes and confirm.  CONSTITUTIONAL: Well appearing, in no acute distress.   SKIN:  warm and dry, no acute rash.   HEAD:  normocephalic, atraumatic.  EYES: EOM intact; conjunctiva and sclera clear.  ENT: No nasal discharge; airway clear.   NECK: Supple; non tender.  CARD: S1, S2 normal; no murmurs, gallops, or rubs. Regular rate and rhythm.   RESP:  Clear to auscultation b/l, no wheezes, rales or rhonchi.  ABD: Normal bowel sounds; soft; non-distended; non-tender; no guarding/ rebound.  EXT: Normal ROM. No clubbing, cyanosis or edema. 2+ pulses to b/l ue/le.  NEURO: Alert, oriented, grossly unremarkable  PSYCH: Cooperative, mood and affect appropriate.

## 2021-12-30 NOTE — ED PROVIDER NOTE - PATIENT PORTAL LINK FT
You can access the FollowMyHealth Patient Portal offered by [facility] by registering at the following website: [email or web address]. By joining [email or web address]’s FollowMyHealth portal, you will also be able to view your health information using other applications (apps) compatible with our system.

## 2021-12-30 NOTE — ED PROVIDER NOTE - OBJECTIVE STATEMENT
66 F with a PMHX of HTN, arthritis on plaquenil presents to the ED with a cc of chest discomfort and HTN, reports testing positive for Covid earlier today and instructed to report to ED for evaluation. States having cough with associated chest discomfort but denies any SOB. Pt able to speak full sentences and is well appearing.

## 2021-12-30 NOTE — ED PROVIDER NOTE - NSFOLLOWUPINSTRUCTIONS_ED_ALL_ED_FT
Call [phone] for Monoclonal Antibody treatment appointment.  Return to ED for increased difficulty breathing, worsening condition.

## 2021-12-30 NOTE — ED ADULT TRIAGE NOTE - CHIEF COMPLAINT QUOTE
COVID pos, complains of left sided chest pain and SOB since this morning. Regular unlabored respiration at triage

## 2021-12-30 NOTE — ED ADULT NURSE REASSESSMENT NOTE - NS ED NURSE REASSESS COMMENT FT1
pulse oximeter provided to pt. pt education on covid-19 given. IV access removed. pt walks out in a stable gait.

## 2021-12-30 NOTE — ED ADULT NURSE NOTE - CHPI ED NUR SYMPTOMS NEG
Patient Education        Bruised Rib: Care Instructions  Overview     You can get a bruised rib if you fall or get hit, such as in an accident or while playing sports. The medical term for a bruise is \"contusion. \" Small blood vessels get torn and leak blood under the skin. Most people think of a bruise as a black-and-blue area. But bones and muscles can also get bruised. An injury may damage the rib but not cause a bruise that you can see. Sometimes it can be hard to tell if a rib is bruised or broken. The symptoms may be the same. And a broken bone can't always be seen on an X-ray. But the treatment for a bruised rib is often the same as treatment for a broken one. An injury to the ribs can cause pain. The pain may be worse when you breathe deeply, cough, or sneeze. In most cases, a bruised rib will heal on its own. You can take pain medicine while the rib mends. Pain relief allows you to take deep breaths. Follow-up care is a key part of your treatment and safety. Be sure to make and go to all appointments, and call your doctor if you are having problems. It's also a good idea to know your test results and keep a list of the medicines you take. How can you care for yourself at home? · Rest and protect the injured or sore area. Stop, change, or take a break from any activity that causes pain. · Put ice or a cold pack on the area for 10 to 20 minutes at a time. Put a thin cloth between the ice and your skin. · After 2 or 3 days, if your swelling is gone, put a heating pad set on low or a warm cloth on your chest. Some doctors suggest that you go back and forth between hot and cold. Put a thin cloth between the heating pad and your skin. · Ask your doctor if you can take an over-the-counter pain medicine, such as acetaminophen (Tylenol), ibuprofen (Advil, Motrin), or naproxen (Aleve). Be safe with medicines. Read and follow all instructions on the label.   · As your pain gets better, slowly return to your best when you take good care of yourself. Eat a variety of healthy foods, and don't smoke. Follow-up care is a key part of your treatment and safety. Be sure to make and go to all appointments, and call your doctor if you are having problems. It's also a good idea to know your test results and keep a list of the medicines you take. How can you care for yourself at home? · Be safe with medicines. Read and follow all instructions on the label. ? If the doctor gave you a prescription medicine for pain, take it as prescribed. ? If you are not taking a prescription pain medicine, ask your doctor if you can take an over-the-counter medicine. · Even if it hurts, try to cough or take the deepest breath you can at least once every hour. This will get air deeply into your lungs. This may reduce your chance of getting pneumonia or a partial collapse of a lung. Hold a pillow against your chest to make this less painful. · Put ice or a cold pack on the area for 10 to 20 minutes at a time. Put a thin cloth between the ice and your skin. When should you call for help? Call 911 anytime you think you may need emergency care. For example, call if:    · You have severe trouble breathing. Call your doctor now or seek immediate medical care if:    · You have some trouble breathing.     · You have a fever.     · You have a new or worse cough. Watch closely for changes in your health, and be sure to contact your doctor if:    · You have pain even after taking your medicine.     · You do not get better as expected. Where can you learn more? Go to [email or web address] org and sign in to your [employer] account. Enter M135 in the Mobile Experience box to learn more about \"Broken Rib: Care Instructions. \"     If you do not have an account, please click on the \"Sign Up Now\" link. Current as of: November 16, 2020               Content Version: 12.9  © 6857-0490 Healthwise, Incorporated.    Care instructions adapted under license by [facility] ([facility]). If you have questions about a medical condition or this instruction, always ask your healthcare professional. [address] any warranty or liability for your use of this information. no chills/no fever/no nausea/no vomiting

## 2022-01-25 ENCOUNTER — EMERGENCY (EMERGENCY)
Facility: HOSPITAL | Age: 67
LOS: 1 days | Discharge: HOME | End: 2022-01-25
Admitting: STUDENT IN AN ORGANIZED HEALTH CARE EDUCATION/TRAINING PROGRAM
Payer: MEDICARE

## 2022-01-25 VITALS
TEMPERATURE: 98 F | SYSTOLIC BLOOD PRESSURE: 160 MMHG | WEIGHT: 160.06 LBS | HEIGHT: 66 IN | DIASTOLIC BLOOD PRESSURE: 98 MMHG | RESPIRATION RATE: 16 BRPM | OXYGEN SATURATION: 95 % | HEART RATE: 74 BPM

## 2022-01-25 DIAGNOSIS — Z90.49 ACQUIRED ABSENCE OF OTHER SPECIFIED PARTS OF DIGESTIVE TRACT: Chronic | ICD-10-CM

## 2022-01-25 DIAGNOSIS — L29.9 PRURITUS, UNSPECIFIED: ICD-10-CM

## 2022-01-25 DIAGNOSIS — Z88.0 ALLERGY STATUS TO PENICILLIN: ICD-10-CM

## 2022-01-25 DIAGNOSIS — Z79.82 LONG TERM (CURRENT) USE OF ASPIRIN: ICD-10-CM

## 2022-01-25 DIAGNOSIS — Z91.013 ALLERGY TO SEAFOOD: ICD-10-CM

## 2022-01-25 PROCEDURE — 82962 GLUCOSE BLOOD TEST: CPT

## 2022-01-25 PROCEDURE — 99283 EMERGENCY DEPT VISIT LOW MDM: CPT

## 2022-01-25 NOTE — ED PROVIDER NOTE - PROVIDER TOKENS
PROVIDER:[TOKEN:[[id number]]],PROVIDER:[TOKEN:[[id number]]],PROVIDER:[TOKEN:[[id number]]]

## 2022-01-25 NOTE — ED PROVIDER NOTE - OBJECTIVE STATEMENT
65 y/o f hx RA presents c/o itching over her arms/legs, abdomen, scalp over the past few days.  Pt stating she saw bugs in her sheets while at the laundromat, although hasn't seen any since.  Pt had an  check her apt and they saw no signs of bed bugs.  Pt reports no rash or bite marks, just feeling itchy which improves with benadryl.  Pt concerned she may have diabetes which "runs in my family."  Denies fever, chills, all other ROS negative.

## 2022-01-25 NOTE — ED PROVIDER NOTE - CARE PROVIDER_API CALL
Levon Stephenson)  Medicine  [address], [address]  [place] [address]  Phone: [phone]  Fax: [phone]  Follow Up Time:     Sharad Harkins  INTERNAL MEDICINE  [address] [place] [address]  Phone: [phone]  Fax: [phone]  Follow Up Time:     Sonny Bello)  Medicine  [address] [place] [address]  Phone: [phone]  Fax: [phone]  Follow Up Time:

## 2022-01-25 NOTE — ED PROVIDER NOTE - CLINICAL SUMMARY MEDICAL DECISION MAKING FREE TEXT BOX
65 y/o f hx RA presents c/o itching to arms, legs, scalp, abdomen over the past 3 days; vss in ED, no evidence of jaundice on exam, no rash or bite marks.  Pt can continue benadryl PRN itching, f/u primary care

## 2022-01-25 NOTE — ED ADULT TRIAGE NOTE - CHIEF COMPLAINT QUOTE
Pt presents to the ED c/o generalized body itching x 4 days. Pt denies any new detergents or foods. Pt states, "When I washed my clothes the other day the washer wasn't going and I saw a bunch of bed bugs." denies any other sx.

## 2022-01-25 NOTE — ED ADULT TRIAGE NOTE - BMI (KG/M2)
Shavonne Oshea, NP  You 20 minutes ago (3:52 PM)     Wonderful. Can u let Danielle know we will stop by Anup's home at 10 am TH for a visit? Thanks.     Message text         25.8

## 2022-01-25 NOTE — ED PROVIDER NOTE - CARE PROVIDERS DIRECT ADDRESSES
[email or web address],DirectAddress_Unknown

## 2022-01-25 NOTE — ED ADULT NURSE NOTE - OBJECTIVE STATEMENT
67yo F Hx "dry skin" uses Cetaphil cream with relief, presents to the ED with c.o generalized body itching x 4 days. Pt reports washing her clothes in the laundry mat and seeing "bed bugs" in the machine. At this time skin is intact with no abnormalities noted. Relief reported with Benadryl. Denies new foods/soaps.

## 2022-01-25 NOTE — ED PROVIDER NOTE - PATIENT PORTAL LINK FT
You can access the FollowMyHealth Patient Portal offered by [facility] by registering at the following website: [email or web address]. By joining [employer]’s FollowMyHealth portal, you will also be able to view your health information using other applications (apps) compatible with our system.

## 2022-01-25 NOTE — ED PROVIDER NOTE - SKIN, MLM
Skin normal color for race, warm, dry and intact. No evidence of rash.  No bite marks, no excoriations

## 2022-05-16 ENCOUNTER — EMERGENCY (EMERGENCY)
Facility: HOSPITAL | Age: 67
LOS: 1 days | Discharge: HOME | End: 2022-05-16
Attending: STUDENT IN AN ORGANIZED HEALTH CARE EDUCATION/TRAINING PROGRAM | Admitting: STUDENT IN AN ORGANIZED HEALTH CARE EDUCATION/TRAINING PROGRAM
Payer: MEDICARE

## 2022-05-16 VITALS
OXYGEN SATURATION: 97 % | TEMPERATURE: 98 F | RESPIRATION RATE: 18 BRPM | DIASTOLIC BLOOD PRESSURE: 81 MMHG | SYSTOLIC BLOOD PRESSURE: 160 MMHG | HEART RATE: 61 BPM

## 2022-05-16 VITALS
RESPIRATION RATE: 18 BRPM | SYSTOLIC BLOOD PRESSURE: 164 MMHG | OXYGEN SATURATION: 97 % | TEMPERATURE: 98 F | HEART RATE: 69 BPM | DIASTOLIC BLOOD PRESSURE: 99 MMHG | WEIGHT: 147.93 LBS | HEIGHT: 66 IN

## 2022-05-16 DIAGNOSIS — R07.89 OTHER CHEST PAIN: ICD-10-CM

## 2022-05-16 DIAGNOSIS — Z90.49 ACQUIRED ABSENCE OF OTHER SPECIFIED PARTS OF DIGESTIVE TRACT: ICD-10-CM

## 2022-05-16 DIAGNOSIS — J45.909 UNSPECIFIED ASTHMA, UNCOMPLICATED: ICD-10-CM

## 2022-05-16 DIAGNOSIS — Z90.49 ACQUIRED ABSENCE OF OTHER SPECIFIED PARTS OF DIGESTIVE TRACT: Chronic | ICD-10-CM

## 2022-05-16 DIAGNOSIS — Z82.3 FAMILY HISTORY OF STROKE: ICD-10-CM

## 2022-05-16 DIAGNOSIS — R06.02 SHORTNESS OF BREATH: ICD-10-CM

## 2022-05-16 DIAGNOSIS — R51.9 HEADACHE, UNSPECIFIED: ICD-10-CM

## 2022-05-16 DIAGNOSIS — M10.9 GOUT, UNSPECIFIED: ICD-10-CM

## 2022-05-16 DIAGNOSIS — I10 ESSENTIAL (PRIMARY) HYPERTENSION: ICD-10-CM

## 2022-05-16 DIAGNOSIS — Z79.82 LONG TERM (CURRENT) USE OF ASPIRIN: ICD-10-CM

## 2022-05-16 DIAGNOSIS — M06.9 RHEUMATOID ARTHRITIS, UNSPECIFIED: ICD-10-CM

## 2022-05-16 DIAGNOSIS — K21.9 GASTRO-ESOPHAGEAL REFLUX DISEASE WITHOUT ESOPHAGITIS: ICD-10-CM

## 2022-05-16 DIAGNOSIS — Z88.0 ALLERGY STATUS TO PENICILLIN: ICD-10-CM

## 2022-05-16 DIAGNOSIS — Z91.013 ALLERGY TO SEAFOOD: ICD-10-CM

## 2022-05-16 DIAGNOSIS — M32.9 SYSTEMIC LUPUS ERYTHEMATOSUS, UNSPECIFIED: ICD-10-CM

## 2022-05-16 LAB
ALBUMIN SERPL ELPH-MCNC: 3.9 G/DL — SIGNIFICANT CHANGE UP (ref 3.3–5)
ALP SERPL-CCNC: 60 U/L — SIGNIFICANT CHANGE UP (ref 40–120)
ALT FLD-CCNC: 12 U/L — SIGNIFICANT CHANGE UP (ref 10–45)
ANION GAP SERPL CALC-SCNC: 9 MMOL/L — SIGNIFICANT CHANGE UP (ref 5–17)
AST SERPL-CCNC: 29 U/L — SIGNIFICANT CHANGE UP (ref 10–40)
BASOPHILS # BLD AUTO: 0 K/UL — SIGNIFICANT CHANGE UP (ref 0–0.2)
BASOPHILS NFR BLD AUTO: 0 % — SIGNIFICANT CHANGE UP (ref 0–2)
BILIRUB SERPL-MCNC: 0.3 MG/DL — SIGNIFICANT CHANGE UP (ref 0.2–1.2)
BUN SERPL-MCNC: 13 MG/DL — SIGNIFICANT CHANGE UP (ref 7–23)
CALCIUM SERPL-MCNC: 9.6 MG/DL — SIGNIFICANT CHANGE UP (ref 8.4–10.5)
CHLORIDE SERPL-SCNC: 107 MMOL/L — SIGNIFICANT CHANGE UP (ref 96–108)
CO2 SERPL-SCNC: 25 MMOL/L — SIGNIFICANT CHANGE UP (ref 22–31)
CREAT SERPL-MCNC: 0.81 MG/DL — SIGNIFICANT CHANGE UP (ref 0.5–1.3)
D DIMER BLD IA.RAPID-MCNC: 275 NG/ML DDU — HIGH
EGFR: 81 ML/MIN/1.73M2 — SIGNIFICANT CHANGE UP
EOSINOPHIL # BLD AUTO: 0 K/UL — SIGNIFICANT CHANGE UP (ref 0–0.5)
EOSINOPHIL NFR BLD AUTO: 0 % — SIGNIFICANT CHANGE UP (ref 0–6)
GIANT PLATELETS BLD QL SMEAR: PRESENT — SIGNIFICANT CHANGE UP
GLUCOSE SERPL-MCNC: 105 MG/DL — HIGH (ref 70–99)
HCT VFR BLD CALC: 39.9 % — SIGNIFICANT CHANGE UP (ref 34.5–45)
HGB BLD-MCNC: 12.4 G/DL — SIGNIFICANT CHANGE UP (ref 11.5–15.5)
LYMPHOCYTES # BLD AUTO: 0.45 K/UL — LOW (ref 1–3.3)
LYMPHOCYTES # BLD AUTO: 14.3 % — SIGNIFICANT CHANGE UP (ref 13–44)
MANUAL SMEAR VERIFICATION: SIGNIFICANT CHANGE UP
MCHC RBC-ENTMCNC: 29.2 PG — SIGNIFICANT CHANGE UP (ref 27–34)
MCHC RBC-ENTMCNC: 31.1 GM/DL — LOW (ref 32–36)
MCV RBC AUTO: 93.9 FL — SIGNIFICANT CHANGE UP (ref 80–100)
MONOCYTES # BLD AUTO: 0.09 K/UL — SIGNIFICANT CHANGE UP (ref 0–0.9)
MONOCYTES NFR BLD AUTO: 2.7 % — SIGNIFICANT CHANGE UP (ref 2–14)
NEUTROPHILS # BLD AUTO: 2.59 K/UL — SIGNIFICANT CHANGE UP (ref 1.8–7.4)
NEUTROPHILS NFR BLD AUTO: 82.1 % — HIGH (ref 43–77)
NRBC # BLD: 1 /100 — HIGH (ref 0–0)
NRBC # BLD: SIGNIFICANT CHANGE UP /100 WBCS (ref 0–0)
PLAT MORPH BLD: ABNORMAL
PLATELET # BLD AUTO: 297 K/UL — SIGNIFICANT CHANGE UP (ref 150–400)
POTASSIUM SERPL-MCNC: 4.2 MMOL/L — SIGNIFICANT CHANGE UP (ref 3.5–5.3)
POTASSIUM SERPL-SCNC: 4.2 MMOL/L — SIGNIFICANT CHANGE UP (ref 3.5–5.3)
PROT SERPL-MCNC: 7.3 G/DL — SIGNIFICANT CHANGE UP (ref 6–8.3)
RBC # BLD: 4.25 M/UL — SIGNIFICANT CHANGE UP (ref 3.8–5.2)
RBC # FLD: 12.9 % — SIGNIFICANT CHANGE UP (ref 10.3–14.5)
RBC BLD AUTO: NORMAL — SIGNIFICANT CHANGE UP
SARS-COV-2 RNA SPEC QL NAA+PROBE: NEGATIVE — SIGNIFICANT CHANGE UP
SODIUM SERPL-SCNC: 141 MMOL/L — SIGNIFICANT CHANGE UP (ref 135–145)
TROPONIN T SERPL-MCNC: 0.02 NG/ML — HIGH (ref 0–0.01)
VARIANT LYMPHS # BLD: 0.9 % — SIGNIFICANT CHANGE UP (ref 0–6)
WBC # BLD: 3.15 K/UL — LOW (ref 3.8–10.5)
WBC # FLD AUTO: 3.15 K/UL — LOW (ref 3.8–10.5)

## 2022-05-16 PROCEDURE — 71045 X-RAY EXAM CHEST 1 VIEW: CPT | Mod: 26

## 2022-05-16 PROCEDURE — U0003: CPT

## 2022-05-16 PROCEDURE — 93010 ELECTROCARDIOGRAM REPORT: CPT

## 2022-05-16 PROCEDURE — 71045 X-RAY EXAM CHEST 1 VIEW: CPT

## 2022-05-16 PROCEDURE — 85379 FIBRIN DEGRADATION QUANT: CPT

## 2022-05-16 PROCEDURE — 84484 ASSAY OF TROPONIN QUANT: CPT

## 2022-05-16 PROCEDURE — 70450 CT HEAD/BRAIN W/O DYE: CPT | Mod: MA

## 2022-05-16 PROCEDURE — 36415 COLL VENOUS BLD VENIPUNCTURE: CPT

## 2022-05-16 PROCEDURE — 87635 SARS-COV-2 COVID-19 AMP PRB: CPT

## 2022-05-16 PROCEDURE — 93005 ELECTROCARDIOGRAM TRACING: CPT

## 2022-05-16 PROCEDURE — U0005: CPT

## 2022-05-16 PROCEDURE — 85025 COMPLETE CBC W/AUTO DIFF WBC: CPT

## 2022-05-16 PROCEDURE — 99285 EMERGENCY DEPT VISIT HI MDM: CPT

## 2022-05-16 PROCEDURE — 70450 CT HEAD/BRAIN W/O DYE: CPT | Mod: 26,MA

## 2022-05-16 PROCEDURE — 80053 COMPREHEN METABOLIC PANEL: CPT

## 2022-05-16 PROCEDURE — 99285 EMERGENCY DEPT VISIT HI MDM: CPT | Mod: 25

## 2022-05-16 RX ORDER — DIPHENHYDRAMINE HCL 50 MG
50 CAPSULE ORAL ONCE
Refills: 0 | Status: DISCONTINUED | OUTPATIENT
Start: 2022-05-16 | End: 2022-05-16

## 2022-05-16 RX ORDER — ACETAMINOPHEN 500 MG
650 TABLET ORAL ONCE
Refills: 0 | Status: COMPLETED | OUTPATIENT
Start: 2022-05-16 | End: 2022-05-16

## 2022-05-16 RX ADMIN — Medication 650 MILLIGRAM(S): at 17:08

## 2022-05-16 NOTE — ED PROVIDER NOTE - CARE PROVIDER_API CALL
Bakari Richardson)  Neurology  [address], [place] [facility]  Phone: [phone]  Fax: [phone]  Follow Up Time:

## 2022-05-16 NOTE — ED PROVIDER NOTE - PHYSICAL EXAMINATION
Const: no acute distress, Well-developed, Eyes: no conjunctival injection and no scleral icterus ENMT: Moist mucus membranes, CVS: +S1/S2, radial/DP pulse 2+ bilaterally  RESP: Unlabored respiratory effort, Clear to auscultation bilaterally GI: Nontender/Nondistended soft abdomen, no CVA tenderness MSK: Extremities w/o deformity or ttp, no leg swelling Psych: Awake, Alert, & Orientedx3;  Appropriate mood and affect, cooperative

## 2022-05-16 NOTE — ED ADULT NURSE NOTE - OBJECTIVE STATEMENT
" I have short of breath and chest tightness , I was told to come here because I might have covid" Hx of HTN.

## 2022-05-16 NOTE — ED PROVIDER NOTE - NS ED ROS FT
Constitutional: no fevers no chills.   CV: no chest pain, no palpitations   HEENT: nasal congestion  Respiratory: +shortness of breath, no cough   GI: no abdominal pain, no nausea no vomiting   Neuro: no headache, no weakness, no numbness  all other ROS is negative except as documented as HPI.

## 2022-05-16 NOTE — ED PROVIDER NOTE - PROGRESS NOTE DETAILS
discussion w/ risk benefits of CT scan given allergy and negative ddimer, based on age adjusted, pt w/ prior ddimers more elevated that todays, suspect age and chronic medical comorbid conditions contributeing to her symtpoms, pt w/ no neurologic deficit

## 2022-05-16 NOTE — ED PROVIDER NOTE - PATIENT PORTAL LINK FT
You can access the FollowMyHealth Patient Portal offered by [facility] by registering at the following website: [email or web address]. By joining [employer]’s FollowMyHealth portal, you will also be able to view your health information using other applications (apps) compatible with our system.

## 2022-05-16 NOTE — ED PROVIDER NOTE - NSFOLLOWUPINSTRUCTIONS_ED_ALL_ED_FT
A headache is pain or discomfort felt around the head or neck area. The specific cause of a headache may not be found as there are many types including tension headaches, migraine headaches, and cluster headaches. Watch your condition for any changes. Things you can do to manage your pain include taking over the counter and prescription medications as instructed by your health care provider, lying down in a dark quiet room, limiting stress, getting regular sleep, and refraining from alcohol and tobacco products.    SEEK IMMEDIATE MEDICAL CARE IF YOU HAVE ANY OF THE FOLLOWING SYMPTOMS: fever, vomiting, stiff neck, loss of vision, problems with speech, muscle weakness, loss of balance, trouble walking, passing out, or confusion.    You were seen in the emergency department for chest pain.     An evaluation that was performed today did not show that you had a dangerous or life threatening cause of your pain.     PLEASE BE AWARE THAT AN EMERGENCY DEPARTMENT EVALUATION CANNOT FULLY RULE OUT ALL RISK OF POTENTIAL LIFE THREATENING CAUSES OF CHEST PAIN - INCLUDING A HEART ATTACK.     Please make an appointment to see your doctor in the next several days for a complete exam. If you have chest pain, dizziness, shortness of breath, numbness, profuse sweating, or pain that radiates to your arms or jaw - return to the ED promptly. Please ask your doctor for a referral for a stress test to evaluate the blood flow to your heart and possible risks of potential heart disease or heart attack    RETURN TO THE ED RIGHT AWAY IF:  - YOU HAVE FURTHER EPISODES OF CHEST PAIN, or if your pain changes, or radiates to your arm, back or jaw or if you have dizziness / sweating or feel that you may vomit - THIS IS AN EMERGENCY.     Do not wait to see if the pain will go away. Get medical help at once. Call your local emergency services ([phone]). Do not drive yourself to the hospital.     RETURN TO THE EMERGENCY DEPARTMENT IF:  - YOU HAVE TROUBLE BREATHING  - YOU FEEL THAT YOUR SYMPTOMS ARE WORSENING  - YOU HAVE FACIAL DROOP OR WEAKNESS ON ONE SIDE OF YOUR BODY  - YOU HAVE FEVERS OVER 100.5 THAT DO NOT GO DOWN WITH MOTRIN OR TYLENOL  - YOU HAVE CONTINUED VOMITING OR DIARRHEA AND YOU CANNOT TOLERATE DRINKING LIQUIDS    YOU MAY ALWAYS RETURN TO THE ED IF YOU FEEL SICK OR HAVE CONCERNS

## 2022-05-16 NOTE — ED PROVIDER NOTE - CLINICAL SUMMARY MEDICAL DECISION MAKING FREE TEXT BOX
67 F w/ hx of RA, gouth asthma here w/ a covid positive rapid test, pt here for evaluation for covid, pt rapid is negative here ambulatory sat is 95 percent, no features to suggest VTE on exam, pt w/ no tachpnea clear lungs, age [id number] ddimer is 600+, current ddimer in the 200s, pt w/ allergy to IV contrast given r/b of CTA given allergy as well as ddimer WNL if using age adjusted, low suspicion for clot at this time, Will dc home w/ outpt Follow up

## 2022-05-16 NOTE — ED PROVIDER NOTE - OBJECTIVE STATEMENT
67 F w/ hx of RA, gout, asthma, sent in by Dr. 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

## 2022-05-17 LAB — SARS-COV-2 RNA SPEC QL NAA+PROBE: SIGNIFICANT CHANGE UP

## 2022-05-27 ENCOUNTER — EMERGENCY (EMERGENCY)
Facility: HOSPITAL | Age: 67
LOS: 1 days | Discharge: HOME | End: 2022-05-27
Attending: STUDENT IN AN ORGANIZED HEALTH CARE EDUCATION/TRAINING PROGRAM | Admitting: STUDENT IN AN ORGANIZED HEALTH CARE EDUCATION/TRAINING PROGRAM
Payer: MEDICARE

## 2022-05-27 VITALS
OXYGEN SATURATION: 96 % | RESPIRATION RATE: 18 BRPM | SYSTOLIC BLOOD PRESSURE: 165 MMHG | HEART RATE: 81 BPM | TEMPERATURE: 98 F | WEIGHT: 149.91 LBS | DIASTOLIC BLOOD PRESSURE: 95 MMHG | HEIGHT: 66 IN

## 2022-05-27 DIAGNOSIS — Z90.49 ACQUIRED ABSENCE OF OTHER SPECIFIED PARTS OF DIGESTIVE TRACT: Chronic | ICD-10-CM

## 2022-05-27 PROCEDURE — 93971 EXTREMITY STUDY: CPT

## 2022-05-27 PROCEDURE — 99284 EMERGENCY DEPT VISIT MOD MDM: CPT | Mod: 25

## 2022-05-27 PROCEDURE — 93971 EXTREMITY STUDY: CPT | Mod: 26,RT

## 2022-05-27 PROCEDURE — 73560 X-RAY EXAM OF KNEE 1 OR 2: CPT

## 2022-05-27 PROCEDURE — 73560 X-RAY EXAM OF KNEE 1 OR 2: CPT | Mod: 26,RT

## 2022-05-27 RX ORDER — ACETAMINOPHEN 500 MG
975 TABLET ORAL ONCE
Refills: 0 | Status: COMPLETED | OUTPATIENT
Start: 2022-05-27 | End: 2022-05-27

## 2022-05-27 RX ADMIN — Medication 975 MILLIGRAM(S): at 16:30

## 2022-05-27 NOTE — ED PROVIDER NOTE - PATIENT PORTAL LINK FT
You can access the FollowMyHealth Patient Portal offered by [facility] by registering at the following website: [email or web address]. By joining [employer]’s FollowMyHealth portal, you will also be able to view your health information using other applications (apps) compatible with our system.

## 2022-05-27 NOTE — ED ADULT NURSE NOTE - OBJECTIVE STATEMENT
Pt with pmx of RA, hyperlipidemia presented to ED with c/o of sudden onset rt knee pain. Pt is unable to bear weight on the affected extremity. Denies trauma, numbness/tingling. AOX4. Patient oriented to ED area. All needs attended. Rounding in progress. Fall risk precautions maintained.

## 2022-05-27 NOTE — ED PROVIDER NOTE - PHYSICAL EXAMINATION
VITAL SIGNS: I have reviewed nursing notes and confirm.  CONSTITUTIONAL: Well appearing, in no acute distress.   SKIN:  warm and dry, no acute rash.   HEAD:  normocephalic, atraumatic.  EYES: EOM intact; conjunctiva and sclera clear.  ENT: No nasal discharge; airway clear.   NECK: Supple; non tender.  CARD: S1, S2 normal; no murmurs, gallops, or rubs. Regular rate and rhythm.   RESP:  Clear to auscultation b/l, no wheezes, rales or rhonchi.  ABD: Normal bowel sounds; soft; non-distended; non-tender; no guarding/ rebound.  EXT: Normal ROM. No clubbing, cyanosis. 2+ pulses to b/l ue/le. 1+edema in RLE up to knee. +ttp of posterior knee and calf. No redness, warmth or skin changes.   NEURO: Alert, oriented, grossly unremarkable  PSYCH: Cooperative, mood and affect appropriate.

## 2022-05-27 NOTE — ED ADULT NURSE NOTE - NSIMPLEMENTINTERV_GEN_ALL_ED
Implemented All Fall Risk Interventions:  [place] to call system. Call bell, personal items and telephone within reach. Instruct patient to call for assistance. Room bathroom lighting operational. Non-slip footwear when patient is off stretcher. Physically safe environment: no spills, clutter or unnecessary equipment. Stretcher in lowest position, wheels locked, appropriate side rails in place. Provide visual cue, wrist band, yellow gown, etc. Monitor gait and stability. Monitor for mental status changes and reorient to person, place, and time. Review medications for side effects contributing to fall risk. Reinforce activity limits and safety measures with patient and family.

## 2022-05-27 NOTE — ED PROVIDER NOTE - CLINICAL SUMMARY MEDICAL DECISION MAKING FREE TEXT BOX
66 yo female with a hx of HTN, GERD, SLE, RA p/w R posterior knee and calf pain that started 2 days ago. 1+ edema on exam with calf ttp. No knee effusion on exam. No external signs of trauma. No concern for fracture/dislocation given no deformities and no hx of trauma. Neurovascularly intact. Possible Baker's cyst or DVT. Will obtain US doppler to evaluate for DVT. Tylenol for pain control. 66 yo female with a hx of HTN, GERD, SLE, RA p/w R posterior knee and calf pain that started 2 days ago. 1+ edema on exam with calf ttp. No knee effusion on exam. No external signs of trauma. No concern for fracture/dislocation given no deformities and no hx of trauma. Neurovascularly intact. Possible Baker's cyst or DVT. Will obtain US doppler to evaluate for DVT. Tylenol for pain control. No clinical concern for septic joint.

## 2022-05-27 NOTE — ED PROVIDER NOTE - NSFOLLOWUPINSTRUCTIONS_ED_ALL_ED_FT
[facility]                                                                                                      Acute Knee Pain, Adult      Acute knee pain is sudden and may be caused by damage, swelling, or irritation of the muscles and tissues that support the knee. Pain may result from:  •A fall.      •An injury to the knee from twisting motions.      •A hit to the knee.      •Infection.      Acute knee pain may go away on its own with time and rest. If it does not, your health care provider may order tests to find the cause of the pain. These may include:  •Imaging tests, such as an X-ray, MRI, CT scan, or ultrasound.      •Joint aspiration. In this test, fluid is removed from the knee and evaluated.      •Arthroscopy. In this test, a lighted tube is inserted into the knee and an image is projected onto a TV screen.      •Biopsy. In this test, a sample of tissue is removed from the body and studied under a microscope.        Follow these instructions at home:      If you have a knee sleeve or brace:      •Wear the knee sleeve or brace as told by your health care provider. Remove it only as told by your health care provider.      •Loosen it if your toes tingle, become numb, or turn cold and blue.      •Keep it clean.    •If the knee sleeve or brace is not waterproof:  •Do not let it get wet.      •Cover it with a watertight covering when you take a bath or shower.        Activity     •Rest your knee.      • Do not do things that cause pain or make pain worse.      •Avoid high-impact activities or exercises, such as running, jumping rope, or doing jumping jacks.      •Work with a physical therapist to make a safe exercise program, as recommended by your health care provider. Do exercises as told by your physical therapist.        Managing pain, stiffness, and swelling    •If directed, put ice on the affected knee. To do this:  •If you have a removable knee sleeve or brace, remove it as told by your health care provider.      •Put ice in a plastic bag.      •Place a towel between your skin and the bag.      •Leave the ice on for 20 minutes, 2–3 times a day.      •Remove the ice if your skin turns bright red. This is very important. If you cannot feel pain, heat, or cold, you have a greater risk of damage to the area.        •If directed, use an elastic bandage to put pressure (compression) on your injured knee. This may control swelling, give support, and help with discomfort.      •Raise (elevate) your knee above the level of your heart while you are sitting or lying down.      •Sleep with a pillow under your knee.      General instructions     •Take over-the-counter and prescription medicines only as told by your health care provider.      • Do not use any products that contain nicotine or tobacco, such as cigarettes, e-cigarettes, and chewing tobacco. If you need help quitting, ask your health care provider.      •If you are overweight, work with your health care provider and a dietitian to set a weight-loss goal that is healthy and reasonable for you. Extra weight can put pressure on your knee.      •Pay attention to any changes in your symptoms.      •Keep all follow-up visits. This is important.        Contact a health care provider if:    •Your knee pain continues, changes, or gets worse.      •You have a fever along with knee pain.      •Your knee feels warm to the touch or is red.      •Your knee vicenta or locks up.        Get help right away if:    •Your knee swells, and the swelling becomes worse.      •You cannot move your knee.      •You have severe pain in your knee that cannot be managed with pain medicine.        Summary    •Acute knee pain can be caused by a fall, an injury, an infection, or damage, swelling, or irritation of the tissues that support your knee.      •Your health care provider may perform tests to find out the cause of the pain.      •Pay attention to any changes in your symptoms. Relieve your pain with rest, medicines, light activity, and the use of ice.      •Get help right away if your knee swells, you cannot move your knee, or you have severe pain that cannot be managed with medicine.      This information is not intended to replace advice given to you by your health care provider. Make sure you discuss any questions you have with your health care provider.      Document Revised: 06/02/2021 Document Reviewed: 06/02/2021    [employer] Patient Education © 2022 [employer] Inc.

## 2022-05-27 NOTE — ED PROVIDER NOTE - OBJECTIVE STATEMENT
66 yo female with a hx of HTN, GERD, SLE, RA p/w R knee pain that started 2 days ago. +R calf pain and swelling. No trauma or falls. No hx of surgeries in R knee. No fevers, chills, chest pain, SOB, orthopnea, PNDs, abdominal pain, n/v/d/c. No hx of DVT/PE. Not on blood thinners.
grossly assessed due to/cardiac/surgical precautions; bilateral UE and LE at least 4-/5

## 2022-05-27 NOTE — ED PROVIDER NOTE - WR INTERPRETATION 1
MSK XR negative - No fracture, No dislocation, No foreign body. Small effusion. Sclerotic lesion noted on tibia.

## 2022-05-27 NOTE — ED PROVIDER NOTE - NSFOLLOWUPCLINICS_GEN_ALL_ED_FT
[facility]  Orthopedics  .  [place]   Phone: [phone]  Fax:

## 2022-05-27 NOTE — ED PROVIDER NOTE - PROGRESS NOTE DETAILS
US duplex negative. XR on my read small effusion + sclerotic lesion. No fracture. Informed pt of need for follow up with MRI and ortho eval.

## 2022-05-31 DIAGNOSIS — M25.561 PAIN IN RIGHT KNEE: ICD-10-CM

## 2022-05-31 DIAGNOSIS — Z82.49 FAMILY HISTORY OF ISCHEMIC HEART DISEASE AND OTHER DISEASES OF THE CIRCULATORY SYSTEM: ICD-10-CM

## 2022-05-31 DIAGNOSIS — M06.9 RHEUMATOID ARTHRITIS, UNSPECIFIED: ICD-10-CM

## 2022-05-31 DIAGNOSIS — M89.8X6 OTHER SPECIFIED DISORDERS OF BONE, LOWER LEG: ICD-10-CM

## 2022-05-31 DIAGNOSIS — M32.9 SYSTEMIC LUPUS ERYTHEMATOSUS, UNSPECIFIED: ICD-10-CM

## 2022-05-31 DIAGNOSIS — Z91.013 ALLERGY TO SEAFOOD: ICD-10-CM

## 2022-05-31 DIAGNOSIS — I10 ESSENTIAL (PRIMARY) HYPERTENSION: ICD-10-CM

## 2022-05-31 DIAGNOSIS — K21.9 GASTRO-ESOPHAGEAL REFLUX DISEASE WITHOUT ESOPHAGITIS: ICD-10-CM

## 2022-05-31 DIAGNOSIS — Z79.82 LONG TERM (CURRENT) USE OF ASPIRIN: ICD-10-CM

## 2022-05-31 DIAGNOSIS — M79.661 PAIN IN RIGHT LOWER LEG: ICD-10-CM

## 2022-05-31 DIAGNOSIS — Z88.0 ALLERGY STATUS TO PENICILLIN: ICD-10-CM

## 2022-05-31 DIAGNOSIS — R60.0 LOCALIZED EDEMA: ICD-10-CM

## 2022-05-31 DIAGNOSIS — Z90.49 ACQUIRED ABSENCE OF OTHER SPECIFIED PARTS OF DIGESTIVE TRACT: ICD-10-CM

## 2022-07-15 NOTE — PATIENT PROFILE ADULT - NSTOBACCONEVERSMOKERY/N_GEN_A
"Subjective:       Patient ID: Yamini Orantes is a 64 y.o. female.    Vitals:  height is 5' 3" (1.6 m) and weight is 56.7 kg (125 lb). Her temperature is 99.6 °F (37.6 °C). Her blood pressure is 143/79 (abnormal) and her pulse is 89. Her respiration is 16 and oxygen saturation is 99%.     Chief Complaint: Animal Bite ( )    Pt states she was bitten and clawed by her cat yesterday. Cat is up to date with shots pt is unsure if utd of tetanus.     Hand Injury   Her dominant hand is their right hand. The incident occurred 12 to 24 hours ago (lastnight). The incident occurred at home. The injury mechanism is unknown. The pain is present in the right hand. The quality of the pain is described as aching. The pain radiates to the right arm. The pain is at a severity of 10/10. The pain is severe. The pain has been constant since the incident. Associated symptoms include numbness. Pertinent negatives include no chest pain, muscle weakness or tingling. The symptoms are aggravated by movement, lifting and palpation. Treatments tried: peroxide and neasporin. The treatment provided mild relief.       Constitution: Negative for chills and fever.   Neck: Negative for neck pain and neck stiffness.   Cardiovascular: Negative for chest pain.   Eyes: Negative for eye redness.   Respiratory: Negative for cough and shortness of breath.    Gastrointestinal: Negative for abdominal pain, nausea and vomiting.   Musculoskeletal: Positive for pain and trauma.   Skin: Negative for rash.   Neurological: Positive for numbness. Negative for altered mental status.   Psychiatric/Behavioral: Negative for altered mental status and confusion.       Objective:      Physical Exam   Constitutional: She is oriented to person, place, and time.  Non-toxic appearance. No distress.   HENT:   Head: Normocephalic and atraumatic.   Ears:   Right Ear: External ear normal.   Left Ear: External ear normal.   Nose: Nose normal.   Mouth/Throat: Mucous membranes " are moist.   Eyes: Conjunctivae are normal.   Neck: Neck supple.   Cardiovascular: Normal rate and regular rhythm.   Pulmonary/Chest: Effort normal.   Musculoskeletal:         General: Swelling and tenderness present.      Right hand: She exhibits decreased range of motion, tenderness and swelling. She exhibits no bony tenderness, normal two-point discrimination and normal capillary refill. Normal strength noted.        Hands:    Lymphadenopathy:     She has no cervical adenopathy.   Neurological: She is alert and oriented to person, place, and time.   Skin: Skin is warm, dry and not diaphoretic.   Psychiatric: Her behavior is normal. Mood, judgment and thought content normal.   Nursing note and vitals reviewed.        Assessment:       1. Cat bite, initial encounter    2. Cellulitis of hand, right          Plan:     Type of Interpretation: ED Physician (Independently Interpreted).  Radiology Procedure Done: Right Hand.  Interpretation: No acute fracture or foreign body identified        Cat bite, initial encounter  -     X-Ray Hand 3 View Right; Future; Expected date: 07/15/2022  -     (In Office Administered) Td Vaccine  -     mupirocin (BACTROBAN) 2 % ointment; Apply to affected area 3 times daily  Dispense: 22 g; Refill: 1  -     Ambulatory referral/consult to Urgent Care    Cellulitis of hand, right  -     X-Ray Hand 3 View Right; Future; Expected date: 07/15/2022  -     (In Office Administered) Td Vaccine  -     amoxicillin-clavulanate 875-125mg (AUGMENTIN) 875-125 mg per tablet; Take 1 tablet by mouth 2 (two) times daily. for 10 days  Dispense: 20 tablet; Refill: 0  -     Ambulatory referral/consult to Urgent Care                    No

## 2022-11-21 NOTE — ED PROVIDER NOTE - CADM POA URETHRAL CATHETER
Quality 110: Preventive Care And Screening: Influenza Immunization: Influenza Immunization Administered during Influenza season Detail Level: Detailed Quality 111:Pneumonia Vaccination Status For Older Adults: Pneumococcal vaccine (PPSV23) administered on or after patient’s 60th birthday and before the end of the measurement period No

## 2022-11-29 NOTE — ED PROVIDER NOTE - TIMING
gradual onset Epidermal Autograft Text: The defect edges were debeveled with a #15 scalpel blade.  Given the location of the defect, shape of the defect and the proximity to free margins an epidermal autograft was deemed most appropriate.  Using a sterile surgical marker, the primary defect shape was transferred to the donor site. The epidermal graft was then harvested.  The skin graft was then placed in the primary defect and oriented appropriately.

## 2023-03-06 NOTE — ED ADULT NURSE NOTE - NSHOSCREENINGQ1_ED_ALL_ED
Hospitalist Progress Note      PCP: Kyler Friend, APRN - CNP    Date of Admission: 2/28/2023    Chief Complaint: Abdominal pain, cough      Subjective:  She feels much today in general.  Less pain, more rested, less tearful. Medications:  Reviewed    Infusion Medications    sodium chloride       Scheduled Medications    [START ON 3/7/2023] pantoprazole  40 mg Oral QAM AC    furosemide  40 mg Oral Daily    spironolactone  25 mg Oral Daily    acetylcysteine  600 mg Inhalation BID    piperacillin-tazobactam  3,375 mg IntraVENous Q8H    levalbuterol  1.25 mg Nebulization Q4H WA    polyethylene glycol  17 g Oral Daily    traZODone  100 mg Oral Nightly    sodium chloride flush  5-40 mL IntraVENous 2 times per day    enoxaparin  40 mg SubCUTAneous Daily    tiotropium  2 puff Inhalation Daily     PRN Meds: sodium chloride, calcium carbonate, sodium chloride, HYDROmorphone **OR** HYDROmorphone, oxyCODONE **OR** oxyCODONE, sodium chloride flush, sodium chloride, ondansetron **OR** ondansetron, acetaminophen, benzocaine-menthol      Intake/Output Summary (Last 24 hours) at 3/6/2023 1044  Last data filed at 3/6/2023 0944  Gross per 24 hour   Intake 1083.74 ml   Output 4050 ml   Net -2966.26 ml       Physical Exam Performed:    BP (!) 142/82   Pulse 95   Temp 98.3 °F (36.8 °C) (Oral)   Resp 16   Ht 5' 1\" (1.549 m)   Wt 111 lb (50.3 kg)   SpO2 (!) 88%   BMI 20.97 kg/m²     General appearance: No apparent distress, appears stated age. HEENT: Pupils equal, round, and reactive to light. Conjunctivae/corneas clear. Neck: Supple, with full range of motion. No jugular venous distention. Trachea midline. Respiratory:  Normal respiratory effort. Clear to auscultation, bilaterally without Rales/Wheezes/Rhonchi. Cardiovascular: Regular rate and rhythm with normal S1/S2 without murmurs, rubs or gallops. Abdomen: Soft, mild diffuse tenderness, non-distended with normal bowel sounds.   Musculoskeletal: No clubbing, cyanosis. 2+ BLE pitting edema. Full range of motion without deformity. Skin: Skin color, texture, turgor normal.  No rashes or lesions. Neurologic:  Neurovascularly intact without any focal sensory/motor deficits. Cranial nerves: II-XII intact, grossly non-focal.  Psychiatric: Alert and oriented, thought content appropriate, has insight. Poor concentration, tangential thoughts, loses her train of thought. Capillary Refill: Brisk, 3 seconds, normal   Peripheral Pulses: +2 palpable, equal bilaterally       Labs:   Recent Labs     03/04/23  0557   WBC 21.5*   HGB 10.6*   HCT 33.3*        Recent Labs     03/04/23  0557   *   K 3.8   CL 95*   CO2 24   BUN 5*   CREATININE <0.5*   CALCIUM 8.5     Recent Labs     03/04/23  0557   AST 24   ALT 18   BILITOT 0.7   ALKPHOS 184*     No results for input(s): INR in the last 72 hours. No results for input(s): Kendal Dross in the last 72 hours. Urinalysis:    No results found for: Lizzy Naren, BACTERIA, RBCUA, BLOODU, [address], [address] [place] [address]    Radiology:  XR CHEST PORTABLE   Final Result   Bibasilar airspace infiltrates with small bilateral pleural effusions. This   could represent atelectasis versus pneumonia in the appropriate clinical   setting         XR CHEST PORTABLE   Final Result   Left lower lobe airspace infiltrates. This could represent atelectasis   versus pneumonia in the appropriate clinical setting.              IP CONSULT TO HOSPITALIST  IP CONSULT TO PULMONOLOGY    Assessment/Plan:    Active Hospital Problems    Diagnosis     Leukocytosis [D72.829]      Priority: Medium    Normocytic normochromic anemia [D64.9]      Priority: Medium    Hyponatremia [E87.1]      Priority: Medium    Pulmonary infiltrates [R91.8]      Priority: Medium    Atelectasis [J98.11]      Priority: Medium    Pleural effusion [J90]      Priority: Medium    Acute on chronic respiratory failure with hypoxia (HCC) [J96.21]      Priority: Medium    Perioperative dehiscence of abdominal wound with evisceration [T81.31XA]      Priority: Medium    Perioperative dehiscence of abdominal wound with evisceration, initial encounter [T81.31XA]      Priority: Medium       64 Y F with a h/o COPD, HLD, and cirrhosis. Admitted here 2/18 with SBO, did not respond to conservative management, required lysis of adhesions and umbilical hernia repair on 2/22, discharged home 2/27. Returned to the ED on 2/28 because she coughed at home, heard a \"pop,\" and saw that intestines had protruded from her abdominal wound. Dehiscence with evisceration. Bowel loops reduced on 2/28, wound closed in OR. Possible HCAP. Zosyn per pulmonary. Pulmonary mucus plugging. Acetylcysteine nebs BID. Volume overload, complicated by bilateral pleural effusions. She normally takes furosemide 40 qd (increased to 40 IV BID here) and spironolactone 25 qd (continued here) in light of her cirrhosis. COPD. Inhaled bronchodilators. She quit cigarettes years ago. Ongoing marijuana use, encouraged cessation. Acute on chronic hypoxic respiratory failure. Due to above issues. Wean to baseline 5LNC. DVT Prophylaxis: enoxaparin  Diet: ADULT DIET; Regular; Low Fiber  ADULT ORAL NUTRITION SUPPLEMENT; Breakfast, Lunch, Dinner; Standard High Calorie/High Protein Oral Supplement  Code Status: Full Code  PT/OT Eval Status: rec'd home PT/OT    Dispo - when she is adequately diuresed and ideally when she is doing well on 5LNC. Perhaps 3/8, pending pulm and surgery input.       Appropriate for A1 Discharge Unit: Yue Brand MD No

## 2023-03-07 NOTE — ED ADULT TRIAGE NOTE - BANDS:
Allergy; Double Island Pedicle Flap Text: The defect edges were debeveled with a #15 scalpel blade.  Given the location of the defect, shape of the defect and the proximity to free margins a double island pedicle advancement flap was deemed most appropriate.  Using a sterile surgical marker, an appropriate advancement flap was drawn incorporating the defect, outlining the appropriate donor tissue and placing the expected incisions within the relaxed skin tension lines where possible.    The area thus outlined was incised deep to adipose tissue with a #15 scalpel blade.  The skin margins were undermined to an appropriate distance in all directions around the primary defect and laterally outward around the island pedicle utilizing iris scissors.  There was minimal undermining beneath the pedicle flap.

## 2023-04-12 ENCOUNTER — EMERGENCY (EMERGENCY)
Facility: HOSPITAL | Age: 68
LOS: 1 days | Discharge: HOME | End: 2023-04-12
Attending: EMERGENCY MEDICINE | Admitting: EMERGENCY MEDICINE
Payer: MEDICARE

## 2023-04-12 VITALS
SYSTOLIC BLOOD PRESSURE: 149 MMHG | HEIGHT: 66 IN | WEIGHT: 149.91 LBS | TEMPERATURE: 98 F | RESPIRATION RATE: 16 BRPM | OXYGEN SATURATION: 97 % | DIASTOLIC BLOOD PRESSURE: 87 MMHG | HEART RATE: 82 BPM

## 2023-04-12 VITALS
RESPIRATION RATE: 18 BRPM | SYSTOLIC BLOOD PRESSURE: 168 MMHG | OXYGEN SATURATION: 98 % | TEMPERATURE: 98 F | DIASTOLIC BLOOD PRESSURE: 85 MMHG | HEART RATE: 69 BPM

## 2023-04-12 DIAGNOSIS — M32.9 SYSTEMIC LUPUS ERYTHEMATOSUS, UNSPECIFIED: ICD-10-CM

## 2023-04-12 DIAGNOSIS — I10 ESSENTIAL (PRIMARY) HYPERTENSION: ICD-10-CM

## 2023-04-12 DIAGNOSIS — R51.9 HEADACHE, UNSPECIFIED: ICD-10-CM

## 2023-04-12 DIAGNOSIS — Z90.49 ACQUIRED ABSENCE OF OTHER SPECIFIED PARTS OF DIGESTIVE TRACT: ICD-10-CM

## 2023-04-12 DIAGNOSIS — Z88.0 ALLERGY STATUS TO PENICILLIN: ICD-10-CM

## 2023-04-12 DIAGNOSIS — Z79.82 LONG TERM (CURRENT) USE OF ASPIRIN: ICD-10-CM

## 2023-04-12 DIAGNOSIS — M06.9 RHEUMATOID ARTHRITIS, UNSPECIFIED: ICD-10-CM

## 2023-04-12 DIAGNOSIS — Z98.818 OTHER DENTAL PROCEDURE STATUS: ICD-10-CM

## 2023-04-12 DIAGNOSIS — Z90.49 ACQUIRED ABSENCE OF OTHER SPECIFIED PARTS OF DIGESTIVE TRACT: Chronic | ICD-10-CM

## 2023-04-12 DIAGNOSIS — R59.0 LOCALIZED ENLARGED LYMPH NODES: ICD-10-CM

## 2023-04-12 DIAGNOSIS — M10.9 GOUT, UNSPECIFIED: ICD-10-CM

## 2023-04-12 DIAGNOSIS — Z91.013 ALLERGY TO SEAFOOD: ICD-10-CM

## 2023-04-12 DIAGNOSIS — Z87.19 PERSONAL HISTORY OF OTHER DISEASES OF THE DIGESTIVE SYSTEM: ICD-10-CM

## 2023-04-12 DIAGNOSIS — M54.2 CERVICALGIA: ICD-10-CM

## 2023-04-12 DIAGNOSIS — K08.89 OTHER SPECIFIED DISORDERS OF TEETH AND SUPPORTING STRUCTURES: ICD-10-CM

## 2023-04-12 DIAGNOSIS — R68.84 JAW PAIN: ICD-10-CM

## 2023-04-12 LAB
ALBUMIN SERPL ELPH-MCNC: 3.7 G/DL — SIGNIFICANT CHANGE UP (ref 3.3–5)
ALP SERPL-CCNC: 58 U/L — SIGNIFICANT CHANGE UP (ref 40–120)
ALT FLD-CCNC: 10 U/L — SIGNIFICANT CHANGE UP (ref 10–45)
ANION GAP SERPL CALC-SCNC: 11 MMOL/L — SIGNIFICANT CHANGE UP (ref 5–17)
ANISOCYTOSIS BLD QL: SLIGHT — SIGNIFICANT CHANGE UP
AST SERPL-CCNC: 23 U/L — SIGNIFICANT CHANGE UP (ref 10–40)
BASOPHILS # BLD AUTO: 0.04 K/UL — SIGNIFICANT CHANGE UP (ref 0–0.2)
BASOPHILS NFR BLD AUTO: 0.9 % — SIGNIFICANT CHANGE UP (ref 0–2)
BILIRUB SERPL-MCNC: 0.3 MG/DL — SIGNIFICANT CHANGE UP (ref 0.2–1.2)
BUN SERPL-MCNC: 15 MG/DL — SIGNIFICANT CHANGE UP (ref 7–23)
BURR CELLS BLD QL SMEAR: PRESENT — SIGNIFICANT CHANGE UP
CALCIUM SERPL-MCNC: 9.2 MG/DL — SIGNIFICANT CHANGE UP (ref 8.4–10.5)
CHLORIDE SERPL-SCNC: 107 MMOL/L — SIGNIFICANT CHANGE UP (ref 96–108)
CO2 SERPL-SCNC: 24 MMOL/L — SIGNIFICANT CHANGE UP (ref 22–31)
CREAT SERPL-MCNC: 0.83 MG/DL — SIGNIFICANT CHANGE UP (ref 0.5–1.3)
DACRYOCYTES BLD QL SMEAR: SLIGHT — SIGNIFICANT CHANGE UP
EGFR: 78 ML/MIN/1.73M2 — SIGNIFICANT CHANGE UP
EOSINOPHIL # BLD AUTO: 0 K/UL — SIGNIFICANT CHANGE UP (ref 0–0.5)
EOSINOPHIL NFR BLD AUTO: 0 % — SIGNIFICANT CHANGE UP (ref 0–6)
GLUCOSE SERPL-MCNC: 104 MG/DL — HIGH (ref 70–99)
HCT VFR BLD CALC: 38.2 % — SIGNIFICANT CHANGE UP (ref 34.5–45)
HGB BLD-MCNC: 12.1 G/DL — SIGNIFICANT CHANGE UP (ref 11.5–15.5)
HYPOCHROMIA BLD QL: SLIGHT — SIGNIFICANT CHANGE UP
LYMPHOCYTES # BLD AUTO: 0.16 K/UL — LOW (ref 1–3.3)
LYMPHOCYTES # BLD AUTO: 3.6 % — LOW (ref 13–44)
MACROCYTES BLD QL: SLIGHT — SIGNIFICANT CHANGE UP
MANUAL SMEAR VERIFICATION: SIGNIFICANT CHANGE UP
MCHC RBC-ENTMCNC: 30 PG — SIGNIFICANT CHANGE UP (ref 27–34)
MCHC RBC-ENTMCNC: 31.7 GM/DL — LOW (ref 32–36)
MCV RBC AUTO: 94.8 FL — SIGNIFICANT CHANGE UP (ref 80–100)
MICROCYTES BLD QL: SLIGHT — SIGNIFICANT CHANGE UP
MONOCYTES # BLD AUTO: 0.16 K/UL — SIGNIFICANT CHANGE UP (ref 0–0.9)
MONOCYTES NFR BLD AUTO: 3.5 % — SIGNIFICANT CHANGE UP (ref 2–14)
NEUTROPHILS # BLD AUTO: 4.11 K/UL — SIGNIFICANT CHANGE UP (ref 1.8–7.4)
NEUTROPHILS NFR BLD AUTO: 92 % — HIGH (ref 43–77)
OVALOCYTES BLD QL SMEAR: SIGNIFICANT CHANGE UP
PLAT MORPH BLD: NORMAL — SIGNIFICANT CHANGE UP
PLATELET # BLD AUTO: 274 K/UL — SIGNIFICANT CHANGE UP (ref 150–400)
POIKILOCYTOSIS BLD QL AUTO: SIGNIFICANT CHANGE UP
POLYCHROMASIA BLD QL SMEAR: SLIGHT — SIGNIFICANT CHANGE UP
POTASSIUM SERPL-MCNC: 4.4 MMOL/L — SIGNIFICANT CHANGE UP (ref 3.5–5.3)
POTASSIUM SERPL-SCNC: 4.4 MMOL/L — SIGNIFICANT CHANGE UP (ref 3.5–5.3)
PROT SERPL-MCNC: 6.8 G/DL — SIGNIFICANT CHANGE UP (ref 6–8.3)
RBC # BLD: 4.03 M/UL — SIGNIFICANT CHANGE UP (ref 3.8–5.2)
RBC # FLD: 13 % — SIGNIFICANT CHANGE UP (ref 10.3–14.5)
RBC BLD AUTO: ABNORMAL
SODIUM SERPL-SCNC: 142 MMOL/L — SIGNIFICANT CHANGE UP (ref 135–145)
WBC # BLD: 4.47 K/UL — SIGNIFICANT CHANGE UP (ref 3.8–10.5)
WBC # FLD AUTO: 4.47 K/UL — SIGNIFICANT CHANGE UP (ref 3.8–10.5)

## 2023-04-12 PROCEDURE — 70491 CT SOFT TISSUE NECK W/DYE: CPT | Mod: 26,MC

## 2023-04-12 PROCEDURE — 70450 CT HEAD/BRAIN W/O DYE: CPT | Mod: 26,MC

## 2023-04-12 PROCEDURE — 85025 COMPLETE CBC W/AUTO DIFF WBC: CPT

## 2023-04-12 PROCEDURE — 70491 CT SOFT TISSUE NECK W/DYE: CPT | Mod: MC

## 2023-04-12 PROCEDURE — 70450 CT HEAD/BRAIN W/O DYE: CPT | Mod: MC

## 2023-04-12 PROCEDURE — 96374 THER/PROPH/DIAG INJ IV PUSH: CPT | Mod: XU

## 2023-04-12 PROCEDURE — 99284 EMERGENCY DEPT VISIT MOD MDM: CPT | Mod: 25

## 2023-04-12 PROCEDURE — 36415 COLL VENOUS BLD VENIPUNCTURE: CPT

## 2023-04-12 PROCEDURE — 80053 COMPREHEN METABOLIC PANEL: CPT

## 2023-04-12 PROCEDURE — 99284 EMERGENCY DEPT VISIT MOD MDM: CPT

## 2023-04-12 RX ORDER — KETOROLAC TROMETHAMINE 30 MG/ML
15 SYRINGE (ML) INJECTION ONCE
Refills: 0 | Status: DISCONTINUED | OUTPATIENT
Start: 2023-04-12 | End: 2023-04-12

## 2023-04-12 RX ORDER — IBUPROFEN 200 MG
1 TABLET ORAL
Qty: 20 | Refills: 0
Start: 2023-04-12

## 2023-04-12 RX ADMIN — Medication 15 MILLIGRAM(S): at 14:35

## 2023-04-12 NOTE — ED PROVIDER NOTE - ENMT, MLM
Airway patent, Nasal mucosa clear. Mouth with normal mucosa. No oropharyngeal erythema or exudates. Mouth with missing right back molars. +tenderness along gum line with some swelling. No drainage or palpable abscess. No drooling or muffled voice. Tolerating secretions. +swelling along right jaw. No meningeal signs.

## 2023-04-12 NOTE — ED ADULT NURSE NOTE - OBJECTIVE STATEMENT
pt presents to ER c/o pain to the R side of her head that radiates to her R neck and R side of her face x1 week. also c/o occasional lightheadedness. denies n/v and fevers.

## 2023-04-12 NOTE — ED PROVIDER NOTE - CLINICAL SUMMARY MEDICAL DECISION MAKING FREE TEXT BOX
67 y/o F with PMHx RA, HTN, and recent dental work several months ago presents to ED c/o tooth pain radiating to her jaw and head. No red flag symptoms of severe headache, thunderclap headache, or meningeal signs. Most likely pain from possible tooth infection. Will obtain labs and CT head/neck with IV contrast to r/o abscess. Pt given toradol for pain. 67 y/o F with PMHx RA, HTN, and recent dental work several months ago presents to ED c/o tooth pain radiating to her jaw and head. No red flag symptoms of severe headache, thunderclap headache, or meningeal signs. Most likely pain from possible tooth infection. Will obtain labs and CT head/neck with IV contrast to r/o abscess. Pt given toradol for pain.    CT without acute findings of infection.  Pt received antibiotics (as per her request) and pain meds and will follow up with dental as outpt.

## 2023-04-12 NOTE — ED PROVIDER NOTE - CPE EDP HEME LYMPH NORM
Consultation Report  Ophthalmology Service    Date: 03/25/2022    Chief complaint/Reason for Consult: concern for zoster ophthalmicus     History of Present Illness: Patricia Nye is a 47 y.o. female with PMHx of HIV, insomnia, anxiety/depression, polysubstance abuse, and hypothyroidism who was admitted to [facility] on March 18th with right periorbital swelling. On admission w/o any visual changes, visual disturbances, such as flashes, floaters, or curtain-veil in visual field OU. Pain mostly overlying nasal skin, no eye pain.    Interval Hx: Ongoing pain, no acute events. No new blurry vision    POcularHx: Denies history of ocular problems or past ocular surgeries.    Current eye gtts: Denies     Family Hx: Denies family history of glaucoma, macular degeneration, or blindness. family history includes Heart disease in her mother.     PMHx:  has a past medical history of Allergy, Anxiety, Cervical dysplasia (1998 / 2016), Depression, HIV infection, Hyperthyroidism, and Insomnia.     PSurgHx:  has a past surgical history that includes Skull fracture elevation.     Home Medications:   Prior to Admission medications    Medication Sig Start Date End Date Taking? Authorizing Provider   ALPRAZolam (XANAX XR) 0.5 MG Tb24 Take one tablet PRN anxiety. 2/10/22   Cornell Britt MD   atovaquone (MEPRON) 750 mg/5 mL Susp Take 1,500 mg by mouth. 10/10/19   Historical Provider   azelastine (ASTELIN) 137 mcg (0.1 %) nasal spray 1 spray (137 mcg total) by Nasal route 2 (two) times daily. 2/19/21 6/29/21  Connie Lake DNP   BIKTARVY -25 mg per tablet Take 1 tablet by mouth once daily. 2/1/22   Pedro Lott MD   busPIRone (BUSPAR) 10 MG tablet Take 1 tablet (10 mg total) by mouth 3 (three) times daily. 2/10/22 3/12/22  Cornell Britt MD   cetirizine (ZYRTEC) 10 MG tablet Take 1 tablet (10 mg total) by mouth once daily. 2/10/22 3/12/22  Cornell Britt MD    diphenhydrAMINE-aluminum-magnesium hydroxide-simethicone-LIDOcaine HCl 2% Swish and spit 15 mLs every 4 (four) hours as needed (mouth pain). 2/10/22   Cornell Britt MD   famotidine (PEPCID) 20 MG tablet Take 1 tablet (20 mg total) by mouth 2 (two) times daily. 2/10/22 3/12/22  Cornell Britt MD   fluconazole (DIFLUCAN) 150 MG Tab One p.o. q.d. x7 days 12/6/21   Cornell Britt MD   fluticasone propionate (FLONASE) 50 mcg/actuation nasal spray 1 spray (50 mcg total) by Each Nostril route 2 (two) times daily as needed for Rhinitis. 2/10/22   Cornell Britt MD   hydrOXYzine HCL (ATARAX) 25 MG tablet Take 1 tablet (25 mg total) by mouth 3 (three) times daily as needed for Anxiety. 11/14/21   Raza Schmitz MD   ibuprofen (ADVIL,MOTRIN) 600 MG tablet Start after steroid complete 1 p.o. q.6 hours p.r.n. pain swell 2/10/22   Cornell Britt MD   ketotifen (ALAWAY) 0.025 % (0.035 %) ophthalmic solution Place 1 drop into both eyes once daily. 2/14/22 2/14/23  Kami Morgan NP   levothyroxine (SYNTHROID) 25 MCG tablet Take 1 tablet (25 mcg total) by mouth before breakfast. 12/1/21 12/31/21  West Christensen MD   LIDOcaine HCl 2% (LIDOCAINE VISCOUS) 2 % Soln TAKE ONE TEASPOONFULS right before eating EVERY 6 HOURS IF NEEDED FOR PAIN 3/18/22   Cornell Britt MD   lithium (LITHOTAB) 300 mg tablet Take 300 mg by mouth 2 (two) times a day. LITHIUM CARBONATE ER    Historical Provider   methocarbamoL (ROBAXIN) 500 MG Tab Take 2 tablets (1,000 mg total) by mouth 3 (three) times daily. for 5 days 3/14/22 3/19/22  Kelly Espino PA-C   mirtazapine (REMERON) 15 MG tablet Take 1 tablet (15 mg total) by mouth every evening. 12/1/21 12/31/21  West Christensen MD   naproxen (NAPROSYN) 500 MG tablet Take 1 tablet (500 mg total) by mouth 2 (two) times daily. 12/18/20   Ritchie Britt MD   predniSONE (DELTASONE) 5 MG tablet 4 po qd x 2, 3 po qd x2, 2 po qd x2, 1 po qd x2 2/10/22   Cornell Britt MD    promethazine (PHENERGAN) 25 MG tablet Take 1 tablet (25 mg total) by mouth every 6 (six) hours as needed for Nausea. 2/1/22   Pedro Lott MD   QUEtiapine (SEROQUEL) 50 MG tablet Take 1 tablet (50 mg total) by mouth every evening. 2/10/22 2/10/23  Cornell Britt MD        Medications this encounter:    acyclovir  480 mg Intravenous Q8H    artificial tears  1 drop Both Eyes QID    atovaquone  1,500 mg Oral Daily    sqylocoup-resluxwu-tznwxkb ala  1 tablet Oral Daily    busPIRone  10 mg Oral TID    doxycycline  100 mg Oral Q12H    erythromycin   Right Eye BID    famotidine  20 mg Oral BID    gabapentin  300 mg Oral TID    levothyroxine  25 mcg Oral Before breakfast    lithium  300 mg Oral BID    methocarbamoL  500 mg Oral QID    mirtazapine  15 mg Oral QHS    nicotine  1 patch Transdermal Daily    polyethylene glycol  17 g Oral Daily       Allergies: is allergic to opioids - morphine analogues, pcn [penicillins], and sulfa (sulfonamide antibiotics).     Social:  reports that she has been smoking vaping with nicotine. She has never used smokeless tobacco. She reports current alcohol use. She reports previous drug use.     ROS: As per HPI    Ocular examination/Dilated fundus examination:  Base Eye Exam     Visual Acuity (Snellen - Linear)       Right Left    Near sc 20/30 20/30          Pupils       Dark Light Shape React APD    Right 3 2 Round Minimal none by reverse    Left 6 4 Round s None          Extraocular Movement       Right Left     Full, Ortho Full, Ortho            Slit Lamp and Fundus Exam     External Exam       Right Left    External V1 dermatome vesicular rash, different stages of healing mostly medially. +Hutchingson sign. Normal          Pen Light Exam       Right Left    Lids/Lashes vesicular rash of UL>LL. Mild swelling Normal    Conjunctiva/Sclera tr inf chemosis, 1+ injection White and quiet    Cornea 2 inferior dendritiform lesions, 1 temporal dendritiform lesion. Clear  normal...    Anterior Chamber deep and formed deep and formed    Iris Round and reactive Round and reactive    Lens Clear Clear    Anterior Vitreous Normal Normal                  Assessment/Plan:     1. Zoster Ophthalmicus, Right Eye  - Patient presents with V1 dermatome distribution vesicular rash starting 2-3 days prior to ophthalmology evaluation on affected side. +[place]  - History of HIV (CD4 99, 11.2%)  - Patient without significantly decreased vision, good IOP.  - Exam revealing no cell/flare, no KP,+pseudodendritic lesions  - No signs of ARN/PORN on previous dfe  - 3/25/22: Few new dendritic appearing lesions inferiorly and temporal.    RECOMMENDATIONS  - continue Erythromycin to skin lesions and right eye BID  - continue Preservative Free Tears QID   - antiviral treatment per CASSIE Marquis MD  [facility]  03/25/2022

## 2023-04-12 NOTE — ED PROVIDER NOTE - INTERNATIONAL TRAVEL
Vaccine Information Statement(s) for was given today. This has been reviewed, questions answered, and verbal consent given by Parent for injection(s) and administration of Influenza (Inactivated).        Patient tolerated without incident. See immunization grid for documentation.     No

## 2023-04-12 NOTE — ED ADULT TRIAGE NOTE - CHIEF COMPLAINT QUOTE
Pt presents to ED C/O 9/10 R neck, jaw and HA pain with bilateral blurred vision x 1 week. Hx RA, gout, HTN. Denies CP at this time.

## 2023-04-12 NOTE — ED PROVIDER NOTE - OBJECTIVE STATEMENT
67 y/o F with PMHx RA (on hydroxychloroquine), gout, and HTN presents to ED c/o right sided head and neck pain for 2 weeks. Pt states she had dental work in November on the right side to her lower jaw and has been having pain since. Over the past 2 weeks, pain has significantly worsened with referred pain to her head and neck. Pt reports swelling to her right jaw and neck. She has been taking tylenol and aspirin with minimal relief. Denies fever, chills, nausea, vomiting, worse headache of her life, and neck stiffness.

## 2023-04-12 NOTE — ED PROVIDER NOTE - PATIENT PORTAL LINK FT
You can access the FollowMyHealth Patient Portal offered by [facility] by registering at the following website: [email or web address]. By joining [employer]’s FollowMyHealth portal, you will also be able to view your health information using other applications (apps) compatible with our system.

## 2023-04-12 NOTE — ED ADULT TRIAGE NOTE - NS ED NURSE BANDS TYPE
Name band; Excisional Biopsy Additional Text (Leave Blank If You Do Not Want): The margin was drawn around the clinically apparent lesion. An elliptical shape was then drawn on the skin incorporating the lesion and margins.  Incisions were then made along these lines to the appropriate tissue plane and the lesion was extirpated.

## 2023-04-12 NOTE — ED PROVIDER NOTE - NSFOLLOWUPINSTRUCTIONS_ED_ALL_ED_FT
Take pain medication and antibiotics as prescribed.  Follow up with your dentist as soon as possible.  Return to ED with any worsening symptoms or other concerns.    Dental Pain    Dental pain is often a sign that something is wrong with your teeth or gums. It is also something that can occur following dental treatment. If you have dental pain, it is important to contact your dental care provider, especially if the cause of the pain has not been determined. Dental pain may be of varying intensity and can be caused by many things, including:  Tooth decay (cavities or caries). Cavities are caused by bacteria that produce acids that irritate the nerve of your tooth, making it sensitive to air and hot or cold temperatures. This eventually causes discomfort or pain.  Abscess or infection. Once the bacteria reach the inner part of the tooth (pulp), a bacterial infection (dental abscess) can occur. Pus typically collects at the end of the root of a tooth.  Injury.  A crack in the tooth.  Gum recession exposing the root, and possibly the nerves, of a tooth.  Gum (periodontal)disease.  Abnormal grinding or clenching.  Poor or improper home care.  An unknown reason (idiopathic).  Your pain may be mild or severe. It may occur when you are:  Chewing.  Exposed to hot or cold temperatures.  Eating or drinking sugary foods or beverages, such as soda or candy.  Your pain may be constant, or it may come and go without cause.    Follow these instructions at home:  The following actions may help to lessen any discomfort that you are feeling before or after getting dental care.    Medicines    Take over-the-counter and prescription medicines only as told by your dental care provider.  If you were prescribed an antibiotic medicine, take it as told by your dental care provider. Do not stop taking the antibiotic even if you start to feel better.  Eating and drinking    Avoid foods or drinks that cause you pain, such as:  Very hot or very cold foods or drinks.  Sweet or sugary foods or drinks.  Managing pain and swelling      Ice can sometimes be used to reduce pain and swelling, especially if the pain is following dental treatment.  If directed, put ice on the painful area of your face. To do this:  Put ice in a plastic bag.  Place a towel between your skin and the bag.  Leave the ice on for 20 minutes, 2–3 times a day.  Remove the ice if your skin turns bright red. This is very important. If you cannot feel pain, heat, or cold, you have a greater risk of damage to the area.  Brushing your teeth    To keep your mouth and gums healthy, brush your teeth twice a day using a fluoride toothpaste.  Use a toothpaste made for sensitive teeth as directed by your dental care provider, especially if the root is exposed.  Always brush your teeth with a soft-bristled toothbrush. This will help prevent irritation to your gums.  General instructions    Floss at least once a day.  Do not apply heat to the outside of the face.  Gargle with a mixture of salt and water 3–4 times a day or as needed. To make salt water, completely dissolve ½–1 tsp (3–6 g) of salt in 1 cup (237 mL) of warm water.  Keep all follow-up visits. This is important.  Contact a dental care provider if:  You have any unexplained dental pain.  Your pain is not controlled with medicines.  Your symptoms get worse.  You have new symptoms.  Get help right away if:  You are unable to open your mouth.  You are having trouble breathing or swallowing.  You have a fever.  You notice that your face, neck, or jaw is swollen.  These symptoms may represent a serious problem that is an emergency. Do not wait to see if the symptoms will go away. Get medical help right away. Call your local emergency services (911 in the [place] Do not drive yourself to the hospital.    Summary  Dental pain may be caused by many things, including tooth decay and infection.  Your pain may be mild or severe.  Take over-the-counter and prescription medicines only as told by your dental care provider.  Watch your dental pain for any changes. Let your dental care provider know if your symptoms get worse.  This information is not intended to replace advice given to you by your health care provider. Make sure you discuss any questions you have with your health care provider.    Document Revised: 09/22/2021 Document Reviewed: 09/22/2021

## 2023-04-27 ENCOUNTER — EMERGENCY (EMERGENCY)
Facility: HOSPITAL | Age: 68
LOS: 1 days | Discharge: HOME | End: 2023-04-27
Admitting: STUDENT IN AN ORGANIZED HEALTH CARE EDUCATION/TRAINING PROGRAM
Payer: MEDICARE

## 2023-04-27 VITALS
OXYGEN SATURATION: 99 % | HEART RATE: 69 BPM | WEIGHT: 147.05 LBS | DIASTOLIC BLOOD PRESSURE: 74 MMHG | RESPIRATION RATE: 18 BRPM | HEIGHT: 66 IN | SYSTOLIC BLOOD PRESSURE: 139 MMHG | TEMPERATURE: 98 F

## 2023-04-27 DIAGNOSIS — Z90.49 ACQUIRED ABSENCE OF OTHER SPECIFIED PARTS OF DIGESTIVE TRACT: Chronic | ICD-10-CM

## 2023-04-27 PROCEDURE — 99283 EMERGENCY DEPT VISIT LOW MDM: CPT

## 2023-04-27 PROCEDURE — 99284 EMERGENCY DEPT VISIT MOD MDM: CPT

## 2023-04-27 RX ORDER — IBUPROFEN 200 MG
1 TABLET ORAL
Qty: 9 | Refills: 0
Start: 2023-04-27 | End: 2023-04-29

## 2023-04-27 RX ORDER — LIDOCAINE 4 G/100G
1 CREAM TOPICAL
Qty: 7 | Refills: 0
Start: 2023-04-27 | End: 2023-05-03

## 2023-04-27 RX ORDER — LIDOCAINE 4 G/100G
1 CREAM TOPICAL ONCE
Refills: 0 | Status: COMPLETED | OUTPATIENT
Start: 2023-04-27 | End: 2023-04-27

## 2023-04-27 RX ADMIN — LIDOCAINE 1 PATCH: 4 CREAM TOPICAL at 18:32

## 2023-04-27 NOTE — ED PROVIDER NOTE - NSFOLLOWUPCLINICS_GEN_ALL_ED_FT
[facility]  Family Medicine  [address], [address]  [place], [place] [address]  Phone: [phone]  Fax:

## 2023-04-27 NOTE — ED PROVIDER NOTE - MUSCULOSKELETAL, MLM
no spinal tend, no discolorations, FROM, discomfort over para sacral area, FROM b/l LE, muscle strength 5/5 b/l, good resistance b/l, normal gait

## 2023-04-27 NOTE — ED PROVIDER NOTE - PATIENT PORTAL LINK FT
You can access the FollowMyHealth Patient Portal offered by [facility] by registering at the following website: [email or web address]. By joining [employer]’s FollowMyHealth portal, you will also be able to view your health information using other applications (apps) compatible with our system.
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
## 2023-04-27 NOTE — ED PROVIDER NOTE - NSFOLLOWUPINSTRUCTIONS_ED_ALL_ED_FT
Back Pain  follow up with your pmd and spine doctor for further evaluation and treatment  Back pain is very common in adults. The cause of back pain is rarely dangerous and the pain often gets better over time. The cause of your back pain may not be known and may include strain of muscles or ligaments, degeneration of the spinal disks, or arthritis. Occasionally the pain may radiate down your leg(s). Over-the-counter medicines to reduce pain and inflammation are often the most helpful. Stretching and remaining active frequently helps the healing process.     SEEK IMMEDIATE MEDICAL CARE IF YOU HAVE ANY OF THE FOLLOWING SYMPTOMS: bowel or bladder control problems, unusual weakness or numbness in your arms or legs, nausea or vomiting, abdominal pain, fever, dizziness/lightheadedness.

## 2023-04-27 NOTE — ED PROVIDER NOTE - CARE PROVIDER_API CALL
Juanjo Mccurdy)  Orthopaedic Surgery  [address] [place] [address], [address] [place] [address]  Phone: [phone]  Fax: [phone]  Follow Up Time:
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
## 2023-04-29 DIAGNOSIS — Z91.013 ALLERGY TO SEAFOOD: ICD-10-CM

## 2023-04-29 DIAGNOSIS — M32.9 SYSTEMIC LUPUS ERYTHEMATOSUS, UNSPECIFIED: ICD-10-CM

## 2023-04-29 DIAGNOSIS — M54.50 LOW BACK PAIN, UNSPECIFIED: ICD-10-CM
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
## 2023-04-29 DIAGNOSIS — Z79.82 LONG TERM (CURRENT) USE OF ASPIRIN: ICD-10-CM

## 2023-04-29 DIAGNOSIS — Z90.49 ACQUIRED ABSENCE OF OTHER SPECIFIED PARTS OF DIGESTIVE TRACT: ICD-10-CM
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
## 2023-04-29 DIAGNOSIS — G89.29 OTHER CHRONIC PAIN: ICD-10-CM

## 2023-04-29 DIAGNOSIS — I10 ESSENTIAL (PRIMARY) HYPERTENSION: ICD-10-CM

## 2023-04-29 DIAGNOSIS — K21.9 GASTRO-ESOPHAGEAL REFLUX DISEASE WITHOUT ESOPHAGITIS: ICD-10-CM

## 2023-06-09 ENCOUNTER — EMERGENCY (EMERGENCY)
Facility: HOSPITAL | Age: 68
LOS: 1 days | Discharge: HOME | End: 2023-06-09
Attending: EMERGENCY MEDICINE | Admitting: EMERGENCY MEDICINE
Payer: MEDICARE

## 2023-06-09 VITALS
OXYGEN SATURATION: 99 % | HEIGHT: 66 IN | HEART RATE: 70 BPM | TEMPERATURE: 98 F | SYSTOLIC BLOOD PRESSURE: 168 MMHG | DIASTOLIC BLOOD PRESSURE: 81 MMHG | WEIGHT: 147.05 LBS | RESPIRATION RATE: 16 BRPM

## 2023-06-09 DIAGNOSIS — Z90.49 ACQUIRED ABSENCE OF OTHER SPECIFIED PARTS OF DIGESTIVE TRACT: ICD-10-CM

## 2023-06-09 DIAGNOSIS — M32.9 SYSTEMIC LUPUS ERYTHEMATOSUS, UNSPECIFIED: ICD-10-CM

## 2023-06-09 DIAGNOSIS — Z88.0 ALLERGY STATUS TO PENICILLIN: ICD-10-CM

## 2023-06-09 DIAGNOSIS — M79.2 NEURALGIA AND NEURITIS, UNSPECIFIED: ICD-10-CM

## 2023-06-09 DIAGNOSIS — I10 ESSENTIAL (PRIMARY) HYPERTENSION: ICD-10-CM

## 2023-06-09 DIAGNOSIS — R68.84 JAW PAIN: ICD-10-CM

## 2023-06-09 DIAGNOSIS — Z91.013 ALLERGY TO SEAFOOD: ICD-10-CM

## 2023-06-09 DIAGNOSIS — K21.9 GASTRO-ESOPHAGEAL REFLUX DISEASE WITHOUT ESOPHAGITIS: ICD-10-CM

## 2023-06-09 DIAGNOSIS — Z90.49 ACQUIRED ABSENCE OF OTHER SPECIFIED PARTS OF DIGESTIVE TRACT: Chronic | ICD-10-CM

## 2023-06-09 DIAGNOSIS — G89.29 OTHER CHRONIC PAIN: ICD-10-CM

## 2023-06-09 PROCEDURE — 99283 EMERGENCY DEPT VISIT LOW MDM: CPT | Mod: 25

## 2023-06-09 PROCEDURE — 99284 EMERGENCY DEPT VISIT MOD MDM: CPT | Mod: FS

## 2023-06-09 PROCEDURE — 96372 THER/PROPH/DIAG INJ SC/IM: CPT

## 2023-06-09 RX ORDER — CELECOXIB 200 MG/1
1 CAPSULE ORAL
Qty: 7 | Refills: 0
Start: 2023-06-09 | End: 2023-06-15

## 2023-06-09 RX ORDER — KETOROLAC TROMETHAMINE 30 MG/ML
15 SYRINGE (ML) INJECTION ONCE
Refills: 0 | Status: DISCONTINUED | OUTPATIENT
Start: 2023-06-09 | End: 2023-06-09

## 2023-06-09 RX ORDER — FAMOTIDINE 10 MG/ML
20 INJECTION INTRAVENOUS ONCE
Refills: 0 | Status: COMPLETED | OUTPATIENT
Start: 2023-06-09 | End: 2023-06-09

## 2023-06-09 RX ORDER — AZELASTINE 137 UG/1
2 SPRAY, METERED NASAL
Qty: 1 | Refills: 0
Start: 2023-06-09 | End: 2023-06-15

## 2023-06-09 RX ADMIN — Medication 15 MILLIGRAM(S): at 13:10

## 2023-06-09 RX ADMIN — FAMOTIDINE 20 MILLIGRAM(S): 10 INJECTION INTRAVENOUS at 13:34

## 2023-06-09 NOTE — ED PROVIDER NOTE - NS ED ROS FT
General: no fever, chills, confusion  Cardiac: no chest pain, chest tightness, palpitations  Lungs: no sob, difficulty breathing  Abdomen: no abdominal pain, nausea, vomiting, diarrhea, constipation, nml BM  : no dysuria, urinary frequency/urgency  +jaw pain   All other systems negative except as per HPI

## 2023-06-09 NOTE — ED PROVIDER NOTE - ATTENDING APP SHARED VISIT CONTRIBUTION OF CARE
69 y/o F with a PMHx of HTN. Lupus, GERD and neuralgia presents to the ER c/o right sided jaw pain, radiating to her neck and to her head that has been persistent since november 2022 after having a tooth extraction. Patient reports she recently went to see a dentist about a week and a half ago and was told that she has nerve damage and that is the reason causing her persistent ongoing discomfort and was referred to see a neurologist. Patient reports that she was seen by the neurologist, but felt like he was not a good fit, and patient did not follow the recommendation of the neurologist. Patient is here in ER due to flare up of pain. Patient denies fever,  headache, dizziness, blurry vision, CP, SOB, any new injuries or paresthesia to UE.    workup in ed with neg ct findings - pain most c/w neuralgia-  pt to have better pain control and follow up with neurology as outpt  Understands dc instructions and follow up precautions 69 y/o F with a PMHx of HTN. Lupus, GERD and neuralgia presents to the ER c/o right sided jaw pain, radiating to her neck and to her head that has been persistent since november 2022 after having a tooth extraction. Patient reports she recently went to see a dentist about a week and a half ago and was told that she has nerve damage and that is the reason causing her persistent ongoing discomfort and was referred to see a neurologist. Patient reports that she was seen by the neurologist, but felt like he was not a good fit, and patient did not follow the recommendation of the neurologist. Patient is here in ER due to flare up of pain. Patient denies fever,  headache, dizziness, blurry vision, CP, SOB, any new injuries or paresthesia to UE.     - pain most c/w neuralgia-  pt to have better pain control and follow up with neurology as outpt  Understands dc instructions and follow up precautions

## 2023-06-09 NOTE — ED PROVIDER NOTE - OBJECTIVE STATEMENT
67 y/o F with a PMHx of HTN. Lupus, GERD and neuralgia presents to the ER c/o right sided jaw pain, radiating to her neck and to her head that has been persistent since november 2022 after having a tooth extraction. Patient reports she recently went to see a dentist about a week and a half ago and was told that she has nerve damage and that is the reason causing her persistent ongoing discomfort and was referred to see a neurologist. Patient reports that she was seen by the neurologist, but felt like he was not a good fit, and patient did not follow the recommendation of the neurologist. Patient is here in ER due to flare up of pain. Patient denies fever,  headache, dizziness, blurry vision, CP, SOB, any new injuries or paresthesia to UE. 67 y/o F with a PMHx of HTN. Lupus, GERD and neuralgia presents to the ER c/o right sided jaw pain, radiating to her neck and to her head that has been persistent since november 2022 after having a tooth extraction. She states it hurts to chela  put on her denture.  Patient reports she recently went to see a dentist about a week and a half ago. She   was told that she has nerve damage and that is the reason that is causing her persistent ongoing discomfort and was referred to see a neurologist. Patient reports that she was seen by the neurologist, but felt like he was not a good fit, and patient did not follow the recommendation of the neurologist. Patient is here in ER due to a flare up of pain. Patient denies fever,  headache, dizziness, blurry vision, CP, SOB, any new injuries or paresthesia to UE.
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
## 2023-06-09 NOTE — ED PROVIDER NOTE - PATIENT PORTAL LINK FT
You can access the FollowMyHealth Patient Portal offered by [facility] by registering at the following website: [email or web address]. By joining [employer]’s FollowMyHealth portal, you will also be able to view your health information using other applications (apps) compatible with our system.

## 2023-06-09 NOTE — ED PROVIDER NOTE - CLINICAL SUMMARY MEDICAL DECISION MAKING FREE TEXT BOX
69 y/o f with neuralgia secondary to dental extraction. Recommend to follow up with neurologist for further evaluation and management. Nsaids  PRN with food.

## 2023-06-09 NOTE — ED PROVIDER NOTE - CARE PROVIDER_API CALL
Danny Olguin  Neurology  [address] [address]  Phone: [phone]  Fax: [phone]  Follow Up Time:

## 2023-06-09 NOTE — ED ADULT NURSE NOTE - NSFALLUNIVINTERV_ED_ALL_ED
Bed/Stretcher in lowest position, wheels locked, appropriate side rails in place/Call bell, personal items and telephone in reach/Instruct patient to call for assistance before getting out of bed/chair/stretcher/Non-slip footwear applied when patient is off stretcher/[place] to call system/Physically safe environment - no spills, clutter or unnecessary equipment/Purposeful proactive rounding/Room/bathroom lighting operational, light cord in reach

## 2023-06-09 NOTE — ED PROVIDER NOTE - ENMT, MLM
Airway patent, Nasal mucosa clear. Mouth with normal mucosa. Throat has no vesicles, no oropharyngeal exudates and uvula is midline. No palpable abscess under gingiva. No facial swelling or cellulitis. Ears are intact. Posterior pharynx is patent. Airway patent, Nasal mucosa clear. Mouth with normal mucosa. Throat has no vesicles, no oropharyngeal exudates and uvula is midline. No palpable abscess along the gingiva. No facial swelling or cellulitis. Ears are intact. Posterior pharynx is patent. TTP at area of extraction. No ac signs of infection.

## 2023-06-09 NOTE — ED PROVIDER NOTE - MUSCULOSKELETAL, MLM
Spine appears normal, range of motion is not limited, full ROM of neck. Has some tenderness along the Right lower jaw over maxilla.

## 2023-07-06 ENCOUNTER — EMERGENCY (EMERGENCY)
Facility: HOSPITAL | Age: 68
LOS: 1 days | Discharge: HOME | End: 2023-07-06
Attending: EMERGENCY MEDICINE | Admitting: EMERGENCY MEDICINE
Payer: MEDICARE

## 2023-07-06 VITALS
RESPIRATION RATE: 16 BRPM | TEMPERATURE: 100 F | HEART RATE: 98 BPM | SYSTOLIC BLOOD PRESSURE: 122 MMHG | DIASTOLIC BLOOD PRESSURE: 88 MMHG | OXYGEN SATURATION: 100 %

## 2023-07-06 VITALS
RESPIRATION RATE: 16 BRPM | OXYGEN SATURATION: 100 % | WEIGHT: 147.05 LBS | SYSTOLIC BLOOD PRESSURE: 119 MMHG | HEIGHT: 66 IN | TEMPERATURE: 100 F | HEART RATE: 100 BPM | DIASTOLIC BLOOD PRESSURE: 74 MMHG

## 2023-07-06 DIAGNOSIS — I10 ESSENTIAL (PRIMARY) HYPERTENSION: ICD-10-CM

## 2023-07-06 DIAGNOSIS — R05.9 COUGH, UNSPECIFIED: ICD-10-CM

## 2023-07-06 DIAGNOSIS — Z90.49 ACQUIRED ABSENCE OF OTHER SPECIFIED PARTS OF DIGESTIVE TRACT: Chronic | ICD-10-CM

## 2023-07-06 DIAGNOSIS — J18.9 PNEUMONIA, UNSPECIFIED ORGANISM: ICD-10-CM

## 2023-07-06 DIAGNOSIS — J45.909 UNSPECIFIED ASTHMA, UNCOMPLICATED: ICD-10-CM

## 2023-07-06 DIAGNOSIS — E78.5 HYPERLIPIDEMIA, UNSPECIFIED: ICD-10-CM

## 2023-07-06 DIAGNOSIS — R11.10 VOMITING, UNSPECIFIED: ICD-10-CM

## 2023-07-06 DIAGNOSIS — Z88.0 ALLERGY STATUS TO PENICILLIN: ICD-10-CM

## 2023-07-06 DIAGNOSIS — Z79.82 LONG TERM (CURRENT) USE OF ASPIRIN: ICD-10-CM

## 2023-07-06 DIAGNOSIS — M10.9 GOUT, UNSPECIFIED: ICD-10-CM

## 2023-07-06 DIAGNOSIS — K21.9 GASTRO-ESOPHAGEAL REFLUX DISEASE WITHOUT ESOPHAGITIS: ICD-10-CM

## 2023-07-06 DIAGNOSIS — Z20.822 CONTACT WITH AND (SUSPECTED) EXPOSURE TO COVID-19: ICD-10-CM

## 2023-07-06 DIAGNOSIS — M06.9 RHEUMATOID ARTHRITIS, UNSPECIFIED: ICD-10-CM

## 2023-07-06 DIAGNOSIS — Z91.013 ALLERGY TO SEAFOOD: ICD-10-CM

## 2023-07-06 LAB
RAPID RVP RESULT: SIGNIFICANT CHANGE UP
SARS-COV-2 RNA SPEC QL NAA+PROBE: SIGNIFICANT CHANGE UP

## 2023-07-06 PROCEDURE — 99284 EMERGENCY DEPT VISIT MOD MDM: CPT | Mod: FS

## 2023-07-06 PROCEDURE — 71046 X-RAY EXAM CHEST 2 VIEWS: CPT

## 2023-07-06 PROCEDURE — 99284 EMERGENCY DEPT VISIT MOD MDM: CPT | Mod: 25

## 2023-07-06 PROCEDURE — 71046 X-RAY EXAM CHEST 2 VIEWS: CPT | Mod: 26

## 2023-07-06 PROCEDURE — 0225U NFCT DS DNA&RNA 21 SARSCOV2: CPT

## 2023-07-06 PROCEDURE — 94640 AIRWAY INHALATION TREATMENT: CPT

## 2023-07-06 RX ORDER — LEVOFLOXACIN 5 MG/ML
1 INJECTION, SOLUTION INTRAVENOUS
Qty: 4 | Refills: 0
Start: 2023-07-06 | End: 2023-07-09

## 2023-07-06 RX ORDER — IPRATROPIUM/ALBUTEROL SULFATE 18-103MCG
3 AEROSOL WITH ADAPTER (GRAM) INHALATION
Refills: 0 | Status: DISCONTINUED | OUTPATIENT
Start: 2023-07-06 | End: 2023-07-09

## 2023-07-06 RX ORDER — ACETAMINOPHEN 500 MG
975 TABLET ORAL ONCE
Refills: 0 | Status: COMPLETED | OUTPATIENT
Start: 2023-07-06 | End: 2023-07-06

## 2023-07-06 RX ORDER — ALBUTEROL 90 UG/1
3 AEROSOL, METERED ORAL
Qty: 28 | Refills: 0
Start: 2023-07-06 | End: 2023-08-04

## 2023-07-06 RX ADMIN — Medication 60 MILLIGRAM(S): at 11:44

## 2023-07-06 RX ADMIN — Medication 3 MILLILITER(S): at 11:44

## 2023-07-06 RX ADMIN — Medication 3 MILLILITER(S): at 11:56

## 2023-07-06 RX ADMIN — Medication 975 MILLIGRAM(S): at 11:30

## 2023-07-06 NOTE — ED PROVIDER NOTE - PATIENT PORTAL LINK FT
You can access the FollowMyHealth Patient Portal offered by [facility] by registering at the following website: [email or web address]. By joining [employer]’s FollowMyHealth portal, you will also be able to view your health information using other applications (apps) compatible with our system.

## 2023-07-06 NOTE — ED ADULT NURSE NOTE - OBJECTIVE STATEMENT
68 year old F patient with c/of cough [id number], worsening yesterday.  Pt breathing easily and unlabored.  Denies chest pain.  Pt c/o of SOB, speaking full clear sentences without distress.

## 2023-07-06 NOTE — ED PROVIDER NOTE - CLINICAL SUMMARY MEDICAL DECISION MAKING FREE TEXT BOX
67 yo F with pmh of HTN, HLD, RA, GERD, gout, asthma c/o cough x 1 month. Occasional productive with white sputum. Cough worsened yesterday. Pt states she felt feverish yesterday. Associated with some sob and cp with coughing. Seen at [facility] last week, given tessalon pearls and claritin and cxr was normal. Pt states she is using her albuterol more frequently. Reports 1 episode of post tussive vomiting yesterday. T 100.3, . NAD., Lungs cta b/l. CXR ?L sided opacity. will tx for pna.

## 2023-07-06 NOTE — ED PROVIDER NOTE - NSFOLLOWUPINSTRUCTIONS_ED_ALL_ED_FT
Pneumonia    Pneumonia is an infection of the lungs. Pneumonia may be caused by bacteria, viruses, or funguses. Symptoms include coughing, fever, chest pain when breathing deeply or coughing, shortness of breath, fatigue, or muscle aches. Pneumonia can be diagnosed with a medical history and physical exam, as well as other tests which may include a chest X-ray. If you were prescribed an antibiotic medicine, take it as told by your health care provider and do not stop taking the antibiotic even if you start to feel better. Do not use tobacco products, including cigarettes, chewing tobacco, and e-cigarettes.    SEEK IMMEDIATE MEDICAL CARE IF YOU HAVE ANY OF THE FOLLOWING SYMPTOMS: worsening shortness of breath, worsening chest pain, coughing up blood, change in mental status, lightheadedness/dizziness. Follow up with your primary care doctor within 1 week.  Return to ED for worsening cough, fever, chills, shortness of breath or any other concerning symptoms   Take antibiotics and prednisone as directed     Pneumonia    Pneumonia is an infection of the lungs. Pneumonia may be caused by bacteria, viruses, or funguses. Symptoms include coughing, fever, chest pain when breathing deeply or coughing, shortness of breath, fatigue, or muscle aches. Pneumonia can be diagnosed with a medical history and physical exam, as well as other tests which may include a chest X-ray. If you were prescribed an antibiotic medicine, take it as told by your health care provider and do not stop taking the antibiotic even if you start to feel better. Do not use tobacco products, including cigarettes, chewing tobacco, and e-cigarettes.    SEEK IMMEDIATE MEDICAL CARE IF YOU HAVE ANY OF THE FOLLOWING SYMPTOMS: worsening shortness of breath, worsening chest pain, coughing up blood, change in mental status, lightheadedness/dizziness.

## 2023-07-06 NOTE — ED ADULT NURSE NOTE - NSFALLUNIVINTERV_ED_ALL_ED
Assistance OOB with selected safe patient handling equipment if applicable/Bed/Stretcher in lowest position, wheels locked, appropriate side rails in place/Call bell, personal items and telephone in reach/Instruct patient to call for assistance before getting out of bed/chair/stretcher/Non-slip footwear applied when patient is off stretcher/[place] to call system/Physically safe environment - no spills, clutter or unnecessary equipment/Purposeful proactive rounding/Room/bathroom lighting operational, light cord in reach

## 2023-07-06 NOTE — ED ADULT TRIAGE NOTE - CHIEF COMPLAINT QUOTE
Pt presenting with cough since returning from [place] a month ago. States she "feels hot inside". Denies taking her temperature at home. Reports feeling fatigued with generalized weakness. Pt has dry cough in triage.

## 2023-07-06 NOTE — ED PROVIDER NOTE - ATTENDING APP SHARED VISIT CONTRIBUTION OF CARE
Pt is a 67yo f, h/o htn, hld, ra, gout, gerd, asthma, who p/w cough x 1 mo, worse yesterday w/ associated fever.+ occasionally prod white sputum. + cp w/ coughing only yesterday. + episode of post-tussive vomiting. Has been using albuerol more frequently. + bodyaches. T 100.3. SaO2 100% on ra. HR 100bpm. Pt is a 67yo f, h/o htn, hld, ra, gout, gerd, asthma, who p/w cough x 1 mo, worse yesterday w/ associated fever.+ occasionally prod white sputum. + cp w/ coughing only yesterday. + episode of post-tussive vomiting. Has been using albuerol more frequently. + bodyaches. T 100.3. SaO2 100% on ra. HR 100bpm. normal RR. PE as above. No resp distress. Lungs cta b/l. + s1, s2. CXR showing ? left side opacity. Will tx for possible pna and asthma exacerbation. Will give prednisone, duonebs, and levaquin. ED evaluation and management discussed with the patient in detail.  Close PMD follow up encouraged.  Strict ED return instructions discussed in detail and patient given the opportunity to ask any questions about their discharge diagnosis and instructions. Patient verbalized understanding.

## 2023-07-17 ENCOUNTER — EMERGENCY (EMERGENCY)
Facility: HOSPITAL | Age: 68
LOS: 1 days | Discharge: HOME | End: 2023-07-17
Attending: STUDENT IN AN ORGANIZED HEALTH CARE EDUCATION/TRAINING PROGRAM | Admitting: STUDENT IN AN ORGANIZED HEALTH CARE EDUCATION/TRAINING PROGRAM
Payer: MEDICARE

## 2023-07-17 VITALS
HEART RATE: 61 BPM | DIASTOLIC BLOOD PRESSURE: 79 MMHG | TEMPERATURE: 98 F | OXYGEN SATURATION: 100 % | RESPIRATION RATE: 17 BRPM | SYSTOLIC BLOOD PRESSURE: 129 MMHG

## 2023-07-17 VITALS
HEART RATE: 80 BPM | HEIGHT: 66 IN | OXYGEN SATURATION: 98 % | TEMPERATURE: 98 F | DIASTOLIC BLOOD PRESSURE: 84 MMHG | SYSTOLIC BLOOD PRESSURE: 166 MMHG | WEIGHT: 143.08 LBS | RESPIRATION RATE: 18 BRPM

## 2023-07-17 DIAGNOSIS — Z90.49 ACQUIRED ABSENCE OF OTHER SPECIFIED PARTS OF DIGESTIVE TRACT: Chronic | ICD-10-CM

## 2023-07-17 LAB
ALBUMIN SERPL ELPH-MCNC: 3.4 G/DL — SIGNIFICANT CHANGE UP (ref 3.3–5)
ALP SERPL-CCNC: 54 U/L — SIGNIFICANT CHANGE UP (ref 40–120)
ALT FLD-CCNC: 13 U/L — SIGNIFICANT CHANGE UP (ref 10–45)
ANION GAP SERPL CALC-SCNC: 10 MMOL/L — SIGNIFICANT CHANGE UP (ref 5–17)
AST SERPL-CCNC: 21 U/L — SIGNIFICANT CHANGE UP (ref 10–40)
BASOPHILS # BLD AUTO: 0.01 K/UL — SIGNIFICANT CHANGE UP (ref 0–0.2)
BASOPHILS NFR BLD AUTO: 0.2 % — SIGNIFICANT CHANGE UP (ref 0–2)
BILIRUB SERPL-MCNC: 0.2 MG/DL — SIGNIFICANT CHANGE UP (ref 0.2–1.2)
BUN SERPL-MCNC: 14 MG/DL — SIGNIFICANT CHANGE UP (ref 7–23)
CALCIUM SERPL-MCNC: 9.5 MG/DL — SIGNIFICANT CHANGE UP (ref 8.4–10.5)
CHLORIDE SERPL-SCNC: 105 MMOL/L — SIGNIFICANT CHANGE UP (ref 96–108)
CO2 SERPL-SCNC: 28 MMOL/L — SIGNIFICANT CHANGE UP (ref 22–31)
CREAT SERPL-MCNC: 0.76 MG/DL — SIGNIFICANT CHANGE UP (ref 0.5–1.3)
EGFR: 86 ML/MIN/1.73M2 — SIGNIFICANT CHANGE UP
EOSINOPHIL # BLD AUTO: 0.01 K/UL — SIGNIFICANT CHANGE UP (ref 0–0.5)
EOSINOPHIL NFR BLD AUTO: 0.2 % — SIGNIFICANT CHANGE UP (ref 0–6)
GLUCOSE SERPL-MCNC: 89 MG/DL — SIGNIFICANT CHANGE UP (ref 70–99)
HCT VFR BLD CALC: 39.2 % — SIGNIFICANT CHANGE UP (ref 34.5–45)
HGB BLD-MCNC: 12.1 G/DL — SIGNIFICANT CHANGE UP (ref 11.5–15.5)
IMM GRANULOCYTES NFR BLD AUTO: 0.3 % — SIGNIFICANT CHANGE UP (ref 0–0.9)
LYMPHOCYTES # BLD AUTO: 0.58 K/UL — LOW (ref 1–3.3)
LYMPHOCYTES # BLD AUTO: 9.7 % — LOW (ref 13–44)
MCHC RBC-ENTMCNC: 29.6 PG — SIGNIFICANT CHANGE UP (ref 27–34)
MCHC RBC-ENTMCNC: 30.9 GM/DL — LOW (ref 32–36)
MCV RBC AUTO: 95.8 FL — SIGNIFICANT CHANGE UP (ref 80–100)
MONOCYTES # BLD AUTO: 0.36 K/UL — SIGNIFICANT CHANGE UP (ref 0–0.9)
MONOCYTES NFR BLD AUTO: 6.1 % — SIGNIFICANT CHANGE UP (ref 2–14)
NEUTROPHILS # BLD AUTO: 4.97 K/UL — SIGNIFICANT CHANGE UP (ref 1.8–7.4)
NEUTROPHILS NFR BLD AUTO: 83.5 % — HIGH (ref 43–77)
NRBC # BLD: 0 /100 WBCS — SIGNIFICANT CHANGE UP (ref 0–0)
PLATELET # BLD AUTO: 303 K/UL — SIGNIFICANT CHANGE UP (ref 150–400)
POTASSIUM SERPL-MCNC: 4.2 MMOL/L — SIGNIFICANT CHANGE UP (ref 3.5–5.3)
POTASSIUM SERPL-SCNC: 4.2 MMOL/L — SIGNIFICANT CHANGE UP (ref 3.5–5.3)
PROT SERPL-MCNC: 7.1 G/DL — SIGNIFICANT CHANGE UP (ref 6–8.3)
RBC # BLD: 4.09 M/UL — SIGNIFICANT CHANGE UP (ref 3.8–5.2)
RBC # FLD: 13.2 % — SIGNIFICANT CHANGE UP (ref 10.3–14.5)
SODIUM SERPL-SCNC: 143 MMOL/L — SIGNIFICANT CHANGE UP (ref 135–145)
TROPONIN T, HIGH SENSITIVITY RESULT: 19 NG/L — SIGNIFICANT CHANGE UP (ref 0–51)
TROPONIN T, HIGH SENSITIVITY RESULT: 28 NG/L — SIGNIFICANT CHANGE UP (ref 0–51)
WBC # BLD: 5.95 K/UL — SIGNIFICANT CHANGE UP (ref 3.8–10.5)
WBC # FLD AUTO: 5.95 K/UL — SIGNIFICANT CHANGE UP (ref 3.8–10.5)

## 2023-07-17 PROCEDURE — 71250 CT THORAX DX C-: CPT | Mod: MA

## 2023-07-17 PROCEDURE — 94640 AIRWAY INHALATION TREATMENT: CPT

## 2023-07-17 PROCEDURE — 71045 X-RAY EXAM CHEST 1 VIEW: CPT | Mod: 26

## 2023-07-17 PROCEDURE — 99285 EMERGENCY DEPT VISIT HI MDM: CPT

## 2023-07-17 PROCEDURE — 93971 EXTREMITY STUDY: CPT | Mod: 26,RT

## 2023-07-17 PROCEDURE — 96374 THER/PROPH/DIAG INJ IV PUSH: CPT

## 2023-07-17 PROCEDURE — 71250 CT THORAX DX C-: CPT | Mod: 26,MA

## 2023-07-17 PROCEDURE — 93005 ELECTROCARDIOGRAM TRACING: CPT

## 2023-07-17 PROCEDURE — 85025 COMPLETE CBC W/AUTO DIFF WBC: CPT

## 2023-07-17 PROCEDURE — 36415 COLL VENOUS BLD VENIPUNCTURE: CPT

## 2023-07-17 PROCEDURE — 71045 X-RAY EXAM CHEST 1 VIEW: CPT

## 2023-07-17 PROCEDURE — 80053 COMPREHEN METABOLIC PANEL: CPT

## 2023-07-17 PROCEDURE — 93971 EXTREMITY STUDY: CPT

## 2023-07-17 PROCEDURE — 99285 EMERGENCY DEPT VISIT HI MDM: CPT | Mod: 25

## 2023-07-17 PROCEDURE — 84484 ASSAY OF TROPONIN QUANT: CPT

## 2023-07-17 RX ORDER — LEVOFLOXACIN 5 MG/ML
1 INJECTION, SOLUTION INTRAVENOUS
Qty: 5 | Refills: 0
Start: 2023-07-17 | End: 2023-07-21

## 2023-07-17 RX ORDER — ALBUTEROL 90 UG/1
2 AEROSOL, METERED ORAL
Qty: 1 | Refills: 0
Start: 2023-07-17

## 2023-07-17 RX ORDER — IPRATROPIUM/ALBUTEROL SULFATE 18-103MCG
3 AEROSOL WITH ADAPTER (GRAM) INHALATION ONCE
Refills: 0 | Status: COMPLETED | OUTPATIENT
Start: 2023-07-17 | End: 2023-07-17

## 2023-07-17 RX ORDER — FLUCONAZOLE 150 MG/1
1 TABLET ORAL
Qty: 1 | Refills: 0
Start: 2023-07-17

## 2023-07-17 RX ADMIN — Medication 3 MILLILITER(S): at 14:39

## 2023-07-17 RX ADMIN — Medication 125 MILLIGRAM(S): at 16:49

## 2023-07-17 RX ADMIN — Medication 3 MILLILITER(S): at 16:49

## 2023-07-17 NOTE — ED PROVIDER NOTE - CARE PROVIDER_API CALL
Amber Oconnell  Pulmonary Disease  [address], [address] [place] [address]  Phone: [phone]  Fax: [phone]  Follow Up Time:

## 2023-07-17 NOTE — ED ADULT TRIAGE NOTE - CHIEF COMPLAINT QUOTE
Pt states "I have been coughing x 1 week with chest tightness. I had PNA last week and I am still coughing. I finished all my antibiotics.

## 2023-07-17 NOTE — ED PROVIDER NOTE - NSFOLLOWUPINSTRUCTIONS_ED_ALL_ED_FT
Wear compression socks ans elevate your legs when able. Follow up with your doctor. Take the steroids and inhaler as needed.    Cough    Coughing is a reflex that clears your throat and your airways. Coughing helps to heal and protect your lungs. It is normal to cough occasionally, but a cough that happens with other symptoms or lasts a long time may be a sign of a condition that needs treatment. Coughing may be caused by infections, asthma or COPD, smoking, postnasal drip, gastroesophageal reflux, as well as other medical conditions. Take medicines only as instructed by your health care provider. Avoid environments or triggers that causes you to cough at work or at home.    SEEK IMMEDIATE MEDICAL CARE IF YOU HAVE ANY OF THE FOLLOWING SYMPTOMS: coughing up blood, shortness of breath, rapid heart rate, chest pain, unexplained weight loss or night sweats.    Asthma    Asthma is a condition in which the airways tighten and narrow, making it difficult to breath. Asthma episodes, also called asthma attacks, range from minor to life-threatening. Symptoms include wheezing, coughing, chest tightness, or shortness of breath. The diagnosis of asthma is made by a review of your medical history and a physical exam, but may involve additional testing. Asthma cannot be cured, but medicines and lifestyle changes can help control it. Avoid triggers of asthma which may include animal dander, pollen, mold, smoke, air pollutants, etc.     SEEK IMMEDIATE MEDICAL CARE IF YOU HAVE ANY OF THE FOLLOWING SYMPTOMS: worsening of symptoms, shortness of breath at rest, chest pain, bluish discoloration to lips or fingertips, lightheadedness/dizziness, or fever. Your CT scan shows that your pneumonia did not go away yet.  Take the antibiotics for the next 5 days as prescribed.  Follow-up with the pulmonary lung specialist. Wear compression socks ans elevate your legs when able. Follow up with your doctor. Take the steroids and inhaler as needed.    Cough    Coughing is a reflex that clears your throat and your airways. Coughing helps to heal and protect your lungs. It is normal to cough occasionally, but a cough that happens with other symptoms or lasts a long time may be a sign of a condition that needs treatment. Coughing may be caused by infections, asthma or COPD, smoking, postnasal drip, gastroesophageal reflux, as well as other medical conditions. Take medicines only as instructed by your health care provider. Avoid environments or triggers that causes you to cough at work or at home.    SEEK IMMEDIATE MEDICAL CARE IF YOU HAVE ANY OF THE FOLLOWING SYMPTOMS: coughing up blood, shortness of breath, rapid heart rate, chest pain, unexplained weight loss or night sweats.    Asthma    Asthma is a condition in which the airways tighten and narrow, making it difficult to breath. Asthma episodes, also called asthma attacks, range from minor to life-threatening. Symptoms include wheezing, coughing, chest tightness, or shortness of breath. The diagnosis of asthma is made by a review of your medical history and a physical exam, but may involve additional testing. Asthma cannot be cured, but medicines and lifestyle changes can help control it. Avoid triggers of asthma which may include animal dander, pollen, mold, smoke, air pollutants, etc.     SEEK IMMEDIATE MEDICAL CARE IF YOU HAVE ANY OF THE FOLLOWING SYMPTOMS: worsening of symptoms, shortness of breath at rest, chest pain, bluish discoloration to lips or fingertips, lightheadedness/dizziness, or fever.

## 2023-07-17 NOTE — ED PROVIDER NOTE - CARE PLAN
Principal Discharge DX:	Cough  Secondary Diagnosis:	Asthma   1 Principal Discharge DX:	Cough  Secondary Diagnosis:	Asthma  Secondary Diagnosis:	Pneumonia

## 2023-07-17 NOTE — ED PROVIDER NOTE - PATIENT PORTAL LINK FT
You can access the FollowMyHealth Patient Portal offered by [facility] by registering at the following website: [email or web address]. By joining [facility]’s FollowMyHealth portal, you will also be able to view your health information using other applications (apps) compatible with our system. You can access the FollowMyHealth Patient Portal offered by [facility] by registering at the following website: [email or web address]. By joining [facility]’s FollowMyHealth portal, you will also be able to view your health information using other applications (apps) compatible with our system.

## 2023-07-17 NOTE — ED ADULT NURSE NOTE - OBJECTIVE STATEMENT
67 y/o F with pmhx HLD and recent PNA (completed abx) presents to the ED for persistent cough, difficulty breathing at night, and CP x1.5 weeks. Denies h/o BLE edema, other cardiac pmhx. On exam, A&Ox4, NAD, ambulatory on RA. VSS. Lungs CTA anteriorly and posteriorly. No cough in exam. Speaking in complete sentences. Skin color appropriate for ethnicity. RLE with +1 pitting edema. PIV placed. Labs sent.

## 2023-07-17 NOTE — ED ADULT NURSE NOTE - NSFALLUNIVINTERV_ED_ALL_ED
Bed/Stretcher in lowest position, wheels locked, appropriate side rails in place/Call bell, personal items and telephone in reach/Instruct patient to call for assistance before getting out of bed/chair/stretcher/Non-slip footwear applied when patient is off stretcher/[place] to call system/Physically safe environment - no spills, clutter or unnecessary equipment/Purposeful proactive rounding/Room/bathroom lighting operational, light cord in reach

## 2023-07-17 NOTE — ED PROVIDER NOTE - NSICDXPASTMEDICALHX_GEN_ALL_CORE_FT
PAST MEDICAL HISTORY:  GERD (gastroesophageal reflux disease)     Hypertension     Lupus       HLD (hyperlipidemia)

## 2023-07-17 NOTE — ED PROVIDER NOTE - PHYSICAL EXAMINATION
CONST: nontoxic NAD speaking in full sentences  HEAD: atraumatic  EYES: conjunctivae clear  NECK: supple  CARD: regular rate  CHEST: breathing comfortably, no stridor/retractions/tripoding. Lungs clear throughout with no wheezing/crackles.  EXT: FROM. Mild pitting edema to right ankle which does not extend up the rest of the leg.   SKIN: warm, dry  NEURO: awake alert answering questions following commands moving all extremities

## 2023-07-17 NOTE — ED PROVIDER NOTE - CLINICAL SUMMARY MEDICAL DECISION MAKING FREE TEXT BOX
DDx includes unresolved pneumonia given recent infection, however pt is afebrile with normal VS, no respiratory distress, and no hypoxia in ED. Will screen with labs and CXR. Symptoms could be viral syndrome or cough variant asthma as pt reports her asthma has presented like this in the past and improvement with nebs. Will trial nebs. Suspect right ankle swelling is related to varicose veins. Will get US to r/o DVT. DDx includes unresolved pneumonia given recent infection, however pt is afebrile with normal VS, no respiratory distress, and no hypoxia in ED. Will screen with labs and CXR. Symptoms could be viral syndrome or cough variant asthma as pt reports her asthma has presented like this in the past and improvement with nebs. Will trial nebs. Suspect right ankle swelling is related to varicose veins. Will get US to r/o DVT.    -->labs ok, CXR no focal infiltrate, pt feeling better after neb, will give 2nd neb and steroid and dc DDx includes unresolved pneumonia given recent infection, however pt is afebrile with normal VS, no respiratory distress, and no hypoxia in ED. Will screen with labs and CXR. Symptoms could be viral syndrome or cough variant asthma as pt reports her asthma has presented like this in the past and improvement with nebs. Will trial nebs. Suspect right ankle swelling is related to varicose veins. Will get US to r/o DVT.    -->labs ok, CXR no focal infiltrate, pt feeling better after neb, will give 2nd neb and steroid and dc  (see progress note for further update)

## 2023-07-17 NOTE — ED PROVIDER NOTE - OBJECTIVE STATEMENT
69 y/o F with PMHx of HTN, HLD, asthma, varicose veins, recently seen on 7/6 for cough presents to ED c/o persistent cough. Pt was seen in ED on 7/6, had CXR done showing hazy opacity in left lower lobe that may represent developing PNA, and was discharged on antibiotics which she completed. Now comes in for persistent cough since 7/6. Denies recent fever, chills, or difficulty breathing. Admits to posttussive chest tightness. Pt has been using asthma inhaler with slight improvement.

## 2023-07-17 NOTE — ED PROVIDER NOTE - PROGRESS NOTE DETAILS
xray read says evolving infiltrate, cancelled dc and got CT chest which shows unchanged pneumonia. Pt stable w/ normal vitals and reassuring labs. No indication for hospital admission. Pt w/ non-resolved pneumonia s/p 5 days levaquin, will give another 5 day course (pt says allergic to pcn and its "cousins"), will give f/u with pulmonary

## 2023-07-20 DIAGNOSIS — Z87.09 PERSONAL HISTORY OF OTHER DISEASES OF THE RESPIRATORY SYSTEM: ICD-10-CM

## 2023-07-20 DIAGNOSIS — E78.5 HYPERLIPIDEMIA, UNSPECIFIED: ICD-10-CM

## 2023-07-20 DIAGNOSIS — Z20.822 CONTACT WITH AND (SUSPECTED) EXPOSURE TO COVID-19: ICD-10-CM

## 2023-07-20 DIAGNOSIS — I83.893 VARICOSE VEINS OF BILATERAL LOWER EXTREMITIES WITH OTHER COMPLICATIONS: ICD-10-CM

## 2023-07-20 DIAGNOSIS — J45.909 UNSPECIFIED ASTHMA, UNCOMPLICATED: ICD-10-CM

## 2023-07-20 DIAGNOSIS — Z87.19 PERSONAL HISTORY OF OTHER DISEASES OF THE DIGESTIVE SYSTEM: ICD-10-CM

## 2023-07-20 DIAGNOSIS — Z91.013 ALLERGY TO SEAFOOD: ICD-10-CM

## 2023-07-20 DIAGNOSIS — R05.9 COUGH, UNSPECIFIED: ICD-10-CM

## 2023-07-20 DIAGNOSIS — J18.9 PNEUMONIA, UNSPECIFIED ORGANISM: ICD-10-CM

## 2023-07-20 DIAGNOSIS — I10 ESSENTIAL (PRIMARY) HYPERTENSION: ICD-10-CM

## 2023-07-20 DIAGNOSIS — Z87.39 PERSONAL HISTORY OF OTHER DISEASES OF THE MUSCULOSKELETAL SYSTEM AND CONNECTIVE TISSUE: ICD-10-CM

## 2023-07-20 DIAGNOSIS — Z88.0 ALLERGY STATUS TO PENICILLIN: ICD-10-CM

## 2023-07-20 DIAGNOSIS — Z90.49 ACQUIRED ABSENCE OF OTHER SPECIFIED PARTS OF DIGESTIVE TRACT: ICD-10-CM

## 2023-07-31 ENCOUNTER — EMERGENCY (EMERGENCY)
Facility: HOSPITAL | Age: 68
LOS: 1 days | Discharge: HOME | End: 2023-07-31
Attending: EMERGENCY MEDICINE | Admitting: EMERGENCY MEDICINE
Payer: MEDICARE

## 2023-07-31 VITALS
RESPIRATION RATE: 20 BRPM | OXYGEN SATURATION: 98 % | HEART RATE: 79 BPM | DIASTOLIC BLOOD PRESSURE: 86 MMHG | TEMPERATURE: 98 F | SYSTOLIC BLOOD PRESSURE: 147 MMHG

## 2023-07-31 VITALS
TEMPERATURE: 98 F | OXYGEN SATURATION: 99 % | WEIGHT: 147.93 LBS | RESPIRATION RATE: 20 BRPM | DIASTOLIC BLOOD PRESSURE: 82 MMHG | SYSTOLIC BLOOD PRESSURE: 129 MMHG | HEART RATE: 87 BPM | HEIGHT: 66 IN

## 2023-07-31 DIAGNOSIS — Z91.013 ALLERGY TO SEAFOOD: ICD-10-CM

## 2023-07-31 DIAGNOSIS — R07.89 OTHER CHEST PAIN: ICD-10-CM

## 2023-07-31 DIAGNOSIS — I10 ESSENTIAL (PRIMARY) HYPERTENSION: ICD-10-CM

## 2023-07-31 DIAGNOSIS — Z90.49 ACQUIRED ABSENCE OF OTHER SPECIFIED PARTS OF DIGESTIVE TRACT: Chronic | ICD-10-CM

## 2023-07-31 DIAGNOSIS — E78.5 HYPERLIPIDEMIA, UNSPECIFIED: ICD-10-CM

## 2023-07-31 DIAGNOSIS — R05.2 SUBACUTE COUGH: ICD-10-CM

## 2023-07-31 DIAGNOSIS — J45.909 UNSPECIFIED ASTHMA, UNCOMPLICATED: ICD-10-CM

## 2023-07-31 DIAGNOSIS — Z79.82 LONG TERM (CURRENT) USE OF ASPIRIN: ICD-10-CM

## 2023-07-31 DIAGNOSIS — M06.9 RHEUMATOID ARTHRITIS, UNSPECIFIED: ICD-10-CM

## 2023-07-31 DIAGNOSIS — Z88.0 ALLERGY STATUS TO PENICILLIN: ICD-10-CM

## 2023-07-31 DIAGNOSIS — Z86.79 PERSONAL HISTORY OF OTHER DISEASES OF THE CIRCULATORY SYSTEM: ICD-10-CM

## 2023-07-31 LAB
ANION GAP SERPL CALC-SCNC: 12 MMOL/L — SIGNIFICANT CHANGE UP (ref 5–17)
BASOPHILS # BLD AUTO: 0 K/UL — SIGNIFICANT CHANGE UP (ref 0–0.2)
BASOPHILS NFR BLD AUTO: 0 % — SIGNIFICANT CHANGE UP (ref 0–2)
BUN SERPL-MCNC: 20 MG/DL — SIGNIFICANT CHANGE UP (ref 7–23)
CALCIUM SERPL-MCNC: 9.2 MG/DL — SIGNIFICANT CHANGE UP (ref 8.4–10.5)
CHLORIDE SERPL-SCNC: 105 MMOL/L — SIGNIFICANT CHANGE UP (ref 96–108)
CO2 SERPL-SCNC: 24 MMOL/L — SIGNIFICANT CHANGE UP (ref 22–31)
CREAT SERPL-MCNC: 0.8 MG/DL — SIGNIFICANT CHANGE UP (ref 0.5–1.3)
DACRYOCYTES BLD QL SMEAR: SLIGHT — SIGNIFICANT CHANGE UP
EGFR: 81 ML/MIN/1.73M2 — SIGNIFICANT CHANGE UP
EOSINOPHIL # BLD AUTO: 0.05 K/UL — SIGNIFICANT CHANGE UP (ref 0–0.5)
EOSINOPHIL NFR BLD AUTO: 0.9 % — SIGNIFICANT CHANGE UP (ref 0–6)
GLUCOSE SERPL-MCNC: 124 MG/DL — HIGH (ref 70–99)
HCT VFR BLD CALC: 39.3 % — SIGNIFICANT CHANGE UP (ref 34.5–45)
HGB BLD-MCNC: 12.2 G/DL — SIGNIFICANT CHANGE UP (ref 11.5–15.5)
LYMPHOCYTES # BLD AUTO: 0.44 K/UL — LOW (ref 1–3.3)
LYMPHOCYTES # BLD AUTO: 7.8 % — LOW (ref 13–44)
MANUAL SMEAR VERIFICATION: SIGNIFICANT CHANGE UP
MCHC RBC-ENTMCNC: 29.9 PG — SIGNIFICANT CHANGE UP (ref 27–34)
MCHC RBC-ENTMCNC: 31 GM/DL — LOW (ref 32–36)
MCV RBC AUTO: 96.3 FL — SIGNIFICANT CHANGE UP (ref 80–100)
MONOCYTES # BLD AUTO: 0.15 K/UL — SIGNIFICANT CHANGE UP (ref 0–0.9)
MONOCYTES NFR BLD AUTO: 2.6 % — SIGNIFICANT CHANGE UP (ref 2–14)
NEUTROPHILS # BLD AUTO: 4.98 K/UL — SIGNIFICANT CHANGE UP (ref 1.8–7.4)
NEUTROPHILS NFR BLD AUTO: 88.7 % — HIGH (ref 43–77)
OVALOCYTES BLD QL SMEAR: SLIGHT — SIGNIFICANT CHANGE UP
PLAT MORPH BLD: NORMAL — SIGNIFICANT CHANGE UP
PLATELET # BLD AUTO: 223 K/UL — SIGNIFICANT CHANGE UP (ref 150–400)
POIKILOCYTOSIS BLD QL AUTO: SLIGHT — SIGNIFICANT CHANGE UP
POTASSIUM SERPL-MCNC: 3.9 MMOL/L — SIGNIFICANT CHANGE UP (ref 3.5–5.3)
POTASSIUM SERPL-SCNC: 3.9 MMOL/L — SIGNIFICANT CHANGE UP (ref 3.5–5.3)
RBC # BLD: 4.08 M/UL — SIGNIFICANT CHANGE UP (ref 3.8–5.2)
RBC # FLD: 13.9 % — SIGNIFICANT CHANGE UP (ref 10.3–14.5)
RBC BLD AUTO: ABNORMAL
SODIUM SERPL-SCNC: 141 MMOL/L — SIGNIFICANT CHANGE UP (ref 135–145)
TROPONIN T, HIGH SENSITIVITY RESULT: 19 NG/L — SIGNIFICANT CHANGE UP (ref 0–51)
WBC # BLD: 5.61 K/UL — SIGNIFICANT CHANGE UP (ref 3.8–10.5)
WBC # FLD AUTO: 5.61 K/UL — SIGNIFICANT CHANGE UP (ref 3.8–10.5)

## 2023-07-31 PROCEDURE — 80048 BASIC METABOLIC PNL TOTAL CA: CPT

## 2023-07-31 PROCEDURE — 71046 X-RAY EXAM CHEST 2 VIEWS: CPT | Mod: 26

## 2023-07-31 PROCEDURE — 99285 EMERGENCY DEPT VISIT HI MDM: CPT

## 2023-07-31 PROCEDURE — 71046 X-RAY EXAM CHEST 2 VIEWS: CPT

## 2023-07-31 PROCEDURE — 85025 COMPLETE CBC W/AUTO DIFF WBC: CPT

## 2023-07-31 PROCEDURE — 84484 ASSAY OF TROPONIN QUANT: CPT

## 2023-07-31 PROCEDURE — 36415 COLL VENOUS BLD VENIPUNCTURE: CPT

## 2023-07-31 PROCEDURE — 93005 ELECTROCARDIOGRAM TRACING: CPT

## 2023-07-31 PROCEDURE — 99285 EMERGENCY DEPT VISIT HI MDM: CPT | Mod: 25

## 2023-07-31 NOTE — ED ADULT NURSE NOTE - NS_NURSE_DISC_TEACHING_YN_ED_ALL_ED
Yes

## 2023-07-31 NOTE — ED ADULT NURSE NOTE - OBJECTIVE STATEMENT
69 y/o F c/o cough spells mostly at night, intermittent SOB and chest pressure when walking. stated " she was recently treated for pneumonia and had antibiotics" wants to make sure she doesn't have it again.

## 2023-07-31 NOTE — ED ADULT TRIAGE NOTE - CHIEF COMPLAINT QUOTE
SOB and cough x 3 days. Recently finished antibiotics for pneumonia, denies fevers. Able to speak in complete sentences, AAOX4, NAD.

## 2023-07-31 NOTE — ED PROVIDER NOTE - OBJECTIVE STATEMENT
68F PMH HTN, HLD, pre-DM, asthma, varicose veins, RA/possible SLE (on hydroxychloroquine) p/w cough. Pt initially presented 7/6 for cough for ~2mos (w/ reported outpt CXR wnl). RVP neg. CXR showed "Hazy opacity in the left lower lobe may represent developing pneumonia." Was dc'd w prednisone 40mg x4d, levaquin 750 x5d, albuterol nebs. Pt returned 7/17 w/ persistent cough and new intermittent CP. Labs notable for initial trop 28 that decreased to 19, other labs grossly wnl. RLE duplex w/ no DVT. CXR showed "Left middle lung zone opacity, suspect developing infiltrate/pneumonia." Had CT chest which showed "Dense subsegmental airspace consolidation in lingula, similar distribution compared to chest radiograph July 6, 2023 consistent with evolving pneumonia. If clinically warranted follow-up CT chest could be performed in 8-12 weeks to confirm resolution." Was dc'd w/ fluconazole x1, levaquin 750 x5d, prednisone 50mg x5d, albuterol inhaler. Pt states that overall the cough is much improved and now only feels it occasionally at night. Still has occasional vague midsternal CP, last episode was yesterday. Wanted to make sure that the pneumonia had resolved so came to ED today. No other systemic symptoms.   Last travel was to [place] ~2mos ago.   Denies hemoptysis, significant weight loss, SOB, nausea, vomiting, diarrhea, lightheaded, diaphoresis, palpitations, rhinorrhea, black stool, bloody stool, LE pain, LE swelling, focal weakness/numbness, recent travel/immobilization, abd pain, urinary complaints, f/c. No hormone use.

## 2023-07-31 NOTE — ED PROVIDER NOTE - CARE PLAN
Principal Discharge DX:	Chest pain   1 Principal Discharge DX:	Chest pain  Secondary Diagnosis:	Cough

## 2023-07-31 NOTE — ED PROVIDER NOTE - PROGRESS NOTE DETAILS
[place]: CXR showed "Decreasing lung opacity left mid/lower lung field." Trop 19 (unchanged from last trop). No CP for >1 day. Currently asymptomatic. Clinically not ACS.   Clinically resolving PNA, no indication for further abx at this time.   Discussed all results with patient, including any incidental radiological findings and lab abnormalities. Given copy of results and instructed to bring copy to primary doctor.   Discussed importance of outpt follow up and return precautions. Clinically no indication for further emergent ED workup or hospitalization at this time. Stable for dc, outpt f/u.

## 2023-07-31 NOTE — ED PROVIDER NOTE - PATIENT PORTAL LINK FT
You can access the FollowMyHealth Patient Portal offered by [facility] by registering at the following website: [email or web address]. By joining [employer]’s FollowMyHealth portal, you will also be able to view your health information using other applications (apps) compatible with our system.

## 2023-07-31 NOTE — ED PROVIDER NOTE - NSFOLLOWUPINSTRUCTIONS_ED_ALL_ED_FT
Can take tylenol 650mg every 6hrs as needed for pain.    Follow up with primary doctor within 1-2 days.    Follow up with cardiologist within 1-2 days. Can call [phone] ([facility]) to schedule appointment.     Return to ER for persistent fever/vomit, uncontrolled pain, worsening breathing, worsening lightheaded, focal weakness/numbness.    Follow up with pulmonologist. Can call [phone] to schedule appointment.     Follow up with neurologist (as per your request). Can call [phone] to schedule appointment.     Nonspecific Chest Pain  Chest pain can be caused by many different conditions. It can be caused by a condition that is life-threatening and requires treatment right away. It can also be caused by something that is not life-threatening. If you have chest pain, it can be hard to know the difference, so it is important to get help right away to make sure that you do not have a serious condition.    Some life-threatening causes of chest pain include:  Heart attack.  A tear in the body's main blood vessel (aortic dissection).  Inflammation around your heart (pericarditis).  A problem in the lungs, such as a blood clot (pulmonary embolism) or a collapsed lung (pneumothorax).    Some non life-threatening causes of chest pain include:  Heartburn.  Anxiety or stress.  Damage to the bones, muscles, and cartilage that make up your chest wall.  Pneumonia or bronchitis.  Shingles infection (varicella-zoster virus).    Chest pain can feel like:  Pain or discomfort on the surface of your chest or deep in your chest.  Crushing, pressure, aching, or squeezing pain.  Burning or tingling.  Dull or sharp pain that is worse when you move, cough, or take a deep breath.  Pain or discomfort that is also felt in your back, neck, jaw, shoulder, or arm, or pain that spreads to any of these areas.  Your chest pain may come and go. It may also be constant. Your health care provider will do lab tests and other studies to find the cause of your pain. Treatment will depend on the cause of your chest pain.    Follow these instructions at home:  Pay attention to any changes in your symptoms. Tell your health care provider about them or any new symptoms. Follow these instructions from your health care provider.    Medicines   Take over-the-counter and prescription medicines only as told by your health care provider.  If you were prescribed an antibiotic, take it as told by your health care provider. Do not stop taking the antibiotic even if you start to feel better.    Lifestyle    Rest as directed by your health care provider.  Do not use any products that contain nicotine or tobacco, such as cigarettes and e-cigarettes. If you need help quitting, ask your health care provider.  Do not drink alcohol.  Make healthy lifestyle choices as recommended. These may include:  Getting regular exercise. Ask your health care provider to suggest some activities that are safe for you.  Eating a heart-healthy diet. This includes plenty of fresh fruits and vegetables, whole grains, low-fat (lean) protein, and low-fat dairy products. A dietitian can help you find healthy eating options.  Maintaining a healthy weight.  Managing any other health conditions you have, such as hypertension or diabetes.  Reducing stress, such as with yoga or relaxation techniques.    General instructions   Avoid any activities that bring on chest pain.  Keep all follow-up visits as told by your health care provider. This is important. This includes visits for any further testing if your chest pain does not go away.    Contact a health care provider if:  Your chest pain does not go away.  You feel depressed.  You have a fever.    Get help right away if:  Your chest pain gets worse.  You have a cough that gets worse, or you cough up blood.  You have severe pain in your abdomen.  You faint.  You have sudden, unexplained chest discomfort.  You have sudden, unexplained discomfort in your arms, back, neck, or jaw.  You have shortness of breath at any time.  You suddenly start to sweat, or your skin gets clammy.  You feel nausea or you vomit.  You suddenly feel lightheaded or dizzy.  You have severe weakness, or unexplained weakness or fatigue.  Your heart begins to beat quickly, or it feels like it is skipping beats.     Pneumonia    Pneumonia is an infection of the lungs. Pneumonia may be caused by bacteria, viruses, or funguses. Symptoms include coughing, fever, chest pain when breathing deeply or coughing, shortness of breath, fatigue, or muscle aches. Pneumonia can be diagnosed with a medical history and physical exam, as well as other tests which may include a chest X-ray. If you were prescribed an antibiotic medicine, take it as told by your health care provider and do not stop taking the antibiotic even if you start to feel better. Do not use tobacco products, including cigarettes, chewing tobacco, and e-cigarettes.    SEEK IMMEDIATE MEDICAL CARE IF YOU HAVE ANY OF THE FOLLOWING SYMPTOMS: worsening shortness of breath, worsening chest pain, coughing up blood, change in mental status, lightheadedness/dizziness.

## 2023-07-31 NOTE — ED PROVIDER NOTE - CLINICAL SUMMARY MEDICAL DECISION MAKING FREE TEXT BOX
68F PMH HTN, HLD, pre-DM, asthma, varicose veins, RA/possible SLE (on hydroxychloroquine) p/w cough. Pt initially presented 7/6 for cough for ~2mos (w/ reported outpt CXR wnl). RVP neg. CXR showed "Hazy opacity in the left lower lobe may represent developing pneumonia." Was dc'd w prednisone 40mg x4d, levaquin 750 x5d, albuterol nebs. Pt returned 7/17 w/ persistent cough and new intermittent CP. Labs notable for initial trop 28 that decreased to 19, other labs grossly wnl. RLE duplex w/ no DVT. CXR showed "Left middle lung zone opacity, suspect developing infiltrate/pneumonia." Had CT chest which showed "Dense subsegmental airspace consolidation in lingula, similar distribution compared to chest radiograph July 6, 2023 consistent with evolving pneumonia. If clinically warranted follow-up CT chest could be performed in 8-12 weeks to confirm resolution." Was dc'd w/ fluconazole x1, levaquin 750 x5d, prednisone 50mg x5d, albuterol inhaler. Pt states that overall the cough is much improved and now only feels it occasionally at night. Still has occasional vague midsternal CP, last episode was yesterday. Wanted to make sure that the pneumonia had resolved so came to ED today. No other systemic symptoms.   Vitals wnl, exam as above.  ddx: Clinically resolving PNA. Low suspicion ACS.   Will rpt CXR (explained to pt that radiographic findings can lag behind clinical improvement), labs, EKG, reassess.

## 2023-07-31 NOTE — ED ADULT NURSE NOTE - NSFALLUNIVINTERV_ED_ALL_ED
Bed/Stretcher in lowest position, wheels locked, appropriate side rails in place/Call bell, personal items and telephone in reach/Instruct patient to call for assistance before getting out of bed/chair/stretcher/Non-slip footwear applied when patient is off stretcher/[place] to call system/Physically safe environment - no spills, clutter or unnecessary equipment/Purposeful proactive rounding/Room/bathroom lighting operational, light cord in reach

## 2023-08-04 ENCOUNTER — APPOINTMENT (OUTPATIENT)
Dept: PULMONOLOGY | Facility: CLINIC | Age: 68
End: 2023-08-04
Payer: MEDICARE

## 2023-08-04 VITALS
BODY MASS INDEX: 23.78 KG/M2 | HEART RATE: 76 BPM | TEMPERATURE: 97.5 F | OXYGEN SATURATION: 98 % | HEIGHT: 66 IN | WEIGHT: 148 LBS | SYSTOLIC BLOOD PRESSURE: 114 MMHG | DIASTOLIC BLOOD PRESSURE: 72 MMHG

## 2023-08-04 DIAGNOSIS — R93.89 ABNORMAL FINDINGS ON DIAGNOSTIC IMAGING OF OTHER SPECIFIED BODY STRUCTURES: ICD-10-CM

## 2023-08-04 PROCEDURE — 99204 OFFICE O/P NEW MOD 45 MIN: CPT

## 2023-08-04 RX ORDER — BUDESONIDE, GLYCOPYRROLATE, AND FORMOTEROL FUMARATE 160; 9; 4.8 UG/1; UG/1; UG/1
160-9-4.8 AEROSOL, METERED RESPIRATORY (INHALATION) DAILY
Qty: 3 | Refills: 0 | Status: COMPLETED | COMMUNITY
Start: 2023-08-04 | End: 2023-11-02

## 2023-08-04 NOTE — PROCEDURE
[FreeTextEntry1] : Images reviewed: 7/6/23: CXR hazy infiltrates left lung 7/17/2023: CXR: Linear opacity left midlung field consistent with subsegmental atelectasis 7/31/2023: Chest x-ray shows smaller linear opacity CT chest 7/17: 10 subsegmental consolidation in lingula, no other abnormalities

## 2023-08-04 NOTE — PHYSICAL EXAM
[No Acute Distress] : no acute distress [Normal Oropharynx] : normal oropharynx [Normal Appearance] : normal appearance [No Neck Mass] : no neck mass [Normal Rate/Rhythm] : normal rate/rhythm [Normal S1, S2] : normal s1, s2 [No Murmurs] : no murmurs [No Resp Distress] : no resp distress [Clear to Auscultation Bilaterally] : clear to auscultation bilaterally [No Abnormalities] : no abnormalities [Normal Gait] : normal gait [No Clubbing] : no clubbing [No Cyanosis] : no cyanosis [FROM] : FROM [1+ Pitting] : 1+ pitting [Normal Color/ Pigmentation] : normal color/ pigmentation [No Focal Deficits] : no focal deficits [Oriented x3] : oriented x3 [Normal Affect] : normal affect

## 2023-08-04 NOTE — HISTORY OF PRESENT ILLNESS
[TextBox_4] :  :  KEO KOEHLER is a 68 year old female who is being evaluated for asthma and an abnormal CXR.  She tells me she has had asthma for about 20 years, but did have other breathing problems since childhood.  She has never smoked.  Over the past year she has been treated with Breztri and occasional albuterol.  Control of her asthma appears to be quite good with rare use of albuterol.  About 2 months ago she began having a severe cough, no fever, no sputum, increased wheezing.  She was seen in the emergency room  and given antibiotics, probably Zithromax, and a short course of prednisone.  At that time small infiltrate was seen in the left lung.  She was followed up in the emergency room July 31.  She feels much better but not back to her usual self.  She continues to use Breztri daily and albuterol about once a day.  She has been told of allergies to ragweed, penicillin, shellfish, and pork.  She is treated for hypertension.  She has been told of mildly elevated blood sugar.  She has a sibling with asthma, both parents and siblings with hypertension.  She reports mild daytime sleepiness, Cannon sleepiness score of 13, generally sleeps about 8 hours, not told of snoring.  She has been told she has rheumatoid arthritis, does not currently have a primary physician.

## 2023-08-04 NOTE — ASSESSMENT
[FreeTextEntry1] : Asthma by history for at least 20 years with good control using LAMA LABA ICS (Breztri), triple therapy may not be required long-term but I will continue this for the next few months.  Her chest x-ray shows clear improvement in infiltrate seen a few weeks ago, and I have reassured her that this is likely resolving atelectasis related to asthma.  I will see her again about 2 months, I would like to get repeat chest x-ray and function testing at that time.

## 2023-08-10 ENCOUNTER — EMERGENCY (EMERGENCY)
Facility: HOSPITAL | Age: 68
LOS: 1 days | Discharge: HOME | End: 2023-08-10
Attending: EMERGENCY MEDICINE | Admitting: EMERGENCY MEDICINE
Payer: MEDICARE

## 2023-08-10 VITALS
SYSTOLIC BLOOD PRESSURE: 134 MMHG | HEIGHT: 66 IN | WEIGHT: 147.93 LBS | TEMPERATURE: 98 F | OXYGEN SATURATION: 98 % | HEART RATE: 90 BPM | RESPIRATION RATE: 18 BRPM | DIASTOLIC BLOOD PRESSURE: 72 MMHG

## 2023-08-10 VITALS
SYSTOLIC BLOOD PRESSURE: 136 MMHG | HEART RATE: 105 BPM | TEMPERATURE: 99 F | DIASTOLIC BLOOD PRESSURE: 80 MMHG | RESPIRATION RATE: 20 BRPM | OXYGEN SATURATION: 97 %

## 2023-08-10 DIAGNOSIS — R05.2 SUBACUTE COUGH: ICD-10-CM

## 2023-08-10 DIAGNOSIS — M54.9 DORSALGIA, UNSPECIFIED: ICD-10-CM

## 2023-08-10 DIAGNOSIS — Z91.013 ALLERGY TO SEAFOOD: ICD-10-CM

## 2023-08-10 DIAGNOSIS — Z79.82 LONG TERM (CURRENT) USE OF ASPIRIN: ICD-10-CM

## 2023-08-10 DIAGNOSIS — R11.0 NAUSEA: ICD-10-CM

## 2023-08-10 DIAGNOSIS — Z88.0 ALLERGY STATUS TO PENICILLIN: ICD-10-CM

## 2023-08-10 DIAGNOSIS — E78.00 PURE HYPERCHOLESTEROLEMIA, UNSPECIFIED: ICD-10-CM

## 2023-08-10 DIAGNOSIS — M32.9 SYSTEMIC LUPUS ERYTHEMATOSUS, UNSPECIFIED: ICD-10-CM

## 2023-08-10 DIAGNOSIS — Z87.19 PERSONAL HISTORY OF OTHER DISEASES OF THE DIGESTIVE SYSTEM: ICD-10-CM

## 2023-08-10 DIAGNOSIS — J12.82 PNEUMONIA DUE TO CORONAVIRUS DISEASE 2019: ICD-10-CM

## 2023-08-10 DIAGNOSIS — M06.9 RHEUMATOID ARTHRITIS, UNSPECIFIED: ICD-10-CM

## 2023-08-10 DIAGNOSIS — U07.1 COVID-19: ICD-10-CM

## 2023-08-10 DIAGNOSIS — Z90.49 ACQUIRED ABSENCE OF OTHER SPECIFIED PARTS OF DIGESTIVE TRACT: Chronic | ICD-10-CM

## 2023-08-10 DIAGNOSIS — Z90.49 ACQUIRED ABSENCE OF OTHER SPECIFIED PARTS OF DIGESTIVE TRACT: ICD-10-CM

## 2023-08-10 DIAGNOSIS — I10 ESSENTIAL (PRIMARY) HYPERTENSION: ICD-10-CM

## 2023-08-10 LAB
ANION GAP SERPL CALC-SCNC: 10 MMOL/L — SIGNIFICANT CHANGE UP (ref 5–17)
BASOPHILS # BLD AUTO: 0 K/UL — SIGNIFICANT CHANGE UP (ref 0–0.2)
BASOPHILS NFR BLD AUTO: 0 % — SIGNIFICANT CHANGE UP (ref 0–2)
BUN SERPL-MCNC: 8 MG/DL — SIGNIFICANT CHANGE UP (ref 7–23)
CALCIUM SERPL-MCNC: 9 MG/DL — SIGNIFICANT CHANGE UP (ref 8.4–10.5)
CHLORIDE SERPL-SCNC: 102 MMOL/L — SIGNIFICANT CHANGE UP (ref 96–108)
CO2 SERPL-SCNC: 23 MMOL/L — SIGNIFICANT CHANGE UP (ref 22–31)
CREAT SERPL-MCNC: 0.72 MG/DL — SIGNIFICANT CHANGE UP (ref 0.5–1.3)
EGFR: 92 ML/MIN/1.73M2 — SIGNIFICANT CHANGE UP
EOSINOPHIL # BLD AUTO: 0 K/UL — SIGNIFICANT CHANGE UP (ref 0–0.5)
EOSINOPHIL NFR BLD AUTO: 0 % — SIGNIFICANT CHANGE UP (ref 0–6)
GLUCOSE SERPL-MCNC: 110 MG/DL — HIGH (ref 70–99)
HCT VFR BLD CALC: 37.8 % — SIGNIFICANT CHANGE UP (ref 34.5–45)
HGB BLD-MCNC: 12.1 G/DL — SIGNIFICANT CHANGE UP (ref 11.5–15.5)
LYMPHOCYTES # BLD AUTO: 0.16 K/UL — LOW (ref 1–3.3)
LYMPHOCYTES # BLD AUTO: 4.4 % — LOW (ref 13–44)
MANUAL SMEAR VERIFICATION: SIGNIFICANT CHANGE UP
MCHC RBC-ENTMCNC: 30.2 PG — SIGNIFICANT CHANGE UP (ref 27–34)
MCHC RBC-ENTMCNC: 32 GM/DL — SIGNIFICANT CHANGE UP (ref 32–36)
MCV RBC AUTO: 94.3 FL — SIGNIFICANT CHANGE UP (ref 80–100)
MONOCYTES # BLD AUTO: 0.1 K/UL — SIGNIFICANT CHANGE UP (ref 0–0.9)
MONOCYTES NFR BLD AUTO: 2.6 % — SIGNIFICANT CHANGE UP (ref 2–14)
NEUTROPHILS # BLD AUTO: 3.44 K/UL — SIGNIFICANT CHANGE UP (ref 1.8–7.4)
NEUTROPHILS NFR BLD AUTO: 92.1 % — HIGH (ref 43–77)
OVALOCYTES BLD QL SMEAR: SLIGHT — SIGNIFICANT CHANGE UP
PLAT MORPH BLD: NORMAL — SIGNIFICANT CHANGE UP
PLATELET # BLD AUTO: 227 K/UL — SIGNIFICANT CHANGE UP (ref 150–400)
POIKILOCYTOSIS BLD QL AUTO: SLIGHT — SIGNIFICANT CHANGE UP
POLYCHROMASIA BLD QL SMEAR: SLIGHT — SIGNIFICANT CHANGE UP
POTASSIUM SERPL-MCNC: 3.4 MMOL/L — LOW (ref 3.5–5.3)
POTASSIUM SERPL-SCNC: 3.4 MMOL/L — LOW (ref 3.5–5.3)
RAPID RVP RESULT: DETECTED
RBC # BLD: 4.01 M/UL — SIGNIFICANT CHANGE UP (ref 3.8–5.2)
RBC # FLD: 13.3 % — SIGNIFICANT CHANGE UP (ref 10.3–14.5)
RBC BLD AUTO: ABNORMAL
SARS-COV-2 RNA SPEC QL NAA+PROBE: DETECTED
SODIUM SERPL-SCNC: 135 MMOL/L — SIGNIFICANT CHANGE UP (ref 135–145)
SPHEROCYTES BLD QL SMEAR: SLIGHT — SIGNIFICANT CHANGE UP
TROPONIN T, HIGH SENSITIVITY RESULT: 24 NG/L — SIGNIFICANT CHANGE UP (ref 0–51)
VARIANT LYMPHS # BLD: 0.9 % — SIGNIFICANT CHANGE UP (ref 0–6)
WBC # BLD: 3.73 K/UL — LOW (ref 3.8–10.5)
WBC # FLD AUTO: 3.73 K/UL — LOW (ref 3.8–10.5)

## 2023-08-10 PROCEDURE — 84484 ASSAY OF TROPONIN QUANT: CPT

## 2023-08-10 PROCEDURE — 99285 EMERGENCY DEPT VISIT HI MDM: CPT

## 2023-08-10 PROCEDURE — 96374 THER/PROPH/DIAG INJ IV PUSH: CPT

## 2023-08-10 PROCEDURE — 80048 BASIC METABOLIC PNL TOTAL CA: CPT

## 2023-08-10 PROCEDURE — 94640 AIRWAY INHALATION TREATMENT: CPT

## 2023-08-10 PROCEDURE — 85025 COMPLETE CBC W/AUTO DIFF WBC: CPT

## 2023-08-10 PROCEDURE — 71046 X-RAY EXAM CHEST 2 VIEWS: CPT | Mod: 26

## 2023-08-10 PROCEDURE — 71046 X-RAY EXAM CHEST 2 VIEWS: CPT

## 2023-08-10 PROCEDURE — 0225U NFCT DS DNA&RNA 21 SARSCOV2: CPT

## 2023-08-10 PROCEDURE — 36415 COLL VENOUS BLD VENIPUNCTURE: CPT

## 2023-08-10 PROCEDURE — 99284 EMERGENCY DEPT VISIT MOD MDM: CPT | Mod: 25

## 2023-08-10 RX ORDER — FLUCONAZOLE 150 MG/1
1 TABLET ORAL
Qty: 3 | Refills: 0
Start: 2023-08-10 | End: 2023-08-12

## 2023-08-10 RX ORDER — ONDANSETRON 8 MG/1
4 TABLET, FILM COATED ORAL ONCE
Refills: 0 | Status: COMPLETED | OUTPATIENT
Start: 2023-08-10 | End: 2023-08-10

## 2023-08-10 RX ORDER — ALBUTEROL 90 UG/1
2.5 AEROSOL, METERED ORAL
Refills: 0 | Status: COMPLETED | OUTPATIENT
Start: 2023-08-10 | End: 2023-08-10

## 2023-08-10 RX ADMIN — ONDANSETRON 4 MILLIGRAM(S): 8 TABLET, FILM COATED ORAL at 11:33

## 2023-08-10 RX ADMIN — ALBUTEROL 2.5 MILLIGRAM(S): 90 AEROSOL, METERED ORAL at 11:37

## 2023-08-10 RX ADMIN — ALBUTEROL 2.5 MILLIGRAM(S): 90 AEROSOL, METERED ORAL at 11:40

## 2023-08-10 RX ADMIN — ALBUTEROL 2.5 MILLIGRAM(S): 90 AEROSOL, METERED ORAL at 11:33

## 2023-08-10 NOTE — ED ADULT NURSE NOTE - DRUG PRE-SCREENING (DAST -1)
Implemented All Fall Risk Interventions:  [place] to call system. Call bell, personal items and telephone within reach. Instruct patient to call for assistance. Room bathroom lighting operational. Non-slip footwear when patient is off stretcher. Physically safe environment: no spills, clutter or unnecessary equipment. Stretcher in lowest position, wheels locked, appropriate side rails in place. Provide visual cue, wrist band, yellow gown, etc. Monitor gait and stability. Monitor for mental status changes and reorient to person, place, and time. Review medications for side effects contributing to fall risk. Reinforce activity limits and safety measures with patient and family.
Statement Selected

## 2023-08-10 NOTE — ED PROVIDER NOTE - CLINICAL SUMMARY MEDICAL DECISION MAKING FREE TEXT BOX
68-year-old female with persistent cough after diagnosis of pneumonia has a history of asthma will check labs chest x-ray viral panel patient concerned that she was exposed to COVID from a friend.  If her labs are normal and chest her x-ray is normal we will plan to have patient use albuterol 2 puffs 4 times a day the next 2 days, will give prednisone 50 mg 1 pill a day for 5 days and encourage to use antihistamine.  Will also give Tessalon Perles for cough.    EKG - no acute ST elevation. Will check troponin 68-year-old female with persistent cough after diagnosis of pneumonia has a history of asthma will check labs chest x-ray viral panel patient concerned that she was exposed to COVID from a friend.  If her labs are normal and chest her x-ray is normal we will plan to have patient use albuterol 2 puffs 4 times a day the next 2 days, will give prednisone 50 mg 1 pill a day for 5 days and encourage to use antihistamine.  Will also give Tessalon Perles for cough.    EKG - no acute ST elevation. Will check troponin    Zofran give for nausea, albuterol/atrovent nebs given for cough.    Pt to f/u with PMD

## 2023-08-10 NOTE — ED PROVIDER NOTE - PATIENT PORTAL LINK FT
You can access the FollowMyHealth Patient Portal offered by [facility] by registering at the following website: [email or web address]. By joining [employer]’s FollowMyHealth portal, you will also be able to view your health information using other applications (apps) compatible with our system.

## 2023-08-10 NOTE — ED PROVIDER NOTE - NSICDXPASTMEDICALHX_GEN_ALL_CORE_FT
PAST MEDICAL HISTORY:  GERD (gastroesophageal reflux disease)     HLD (hyperlipidemia)     Hypertension     Lupus

## 2023-08-10 NOTE — ED ADULT NURSE NOTE - OBJECTIVE STATEMENT
Pt A&Ox4 presents to ED with cough x 6 weeks. Pt endorses intermittent chest pain, shortness of breath, and nausea. Reports chest pain is present when she is coughing. Saw PCP and was diagnosed with pneumonia. Denies vomiting, diarrhea, urinary symptoms, dizziness. EKG obtained. Ambulates with steady gait.

## 2023-08-10 NOTE — ED ADULT NURSE NOTE - NSFALLUNIVINTERV_ED_ALL_ED
Bed/Stretcher in lowest position, wheels locked, appropriate side rails in place/Call bell, personal items and telephone in reach/Instruct patient to call for assistance before getting out of bed/chair/stretcher/Non-slip footwear applied when patient is off stretcher/[place] to call system/Physically safe environment - no spills, clutter or unnecessary equipment/Purposeful proactive rounding/Room/bathroom lighting operational, light cord in reach

## 2023-08-10 NOTE — ED PROVIDER NOTE - NSFOLLOWUPINSTRUCTIONS_ED_ALL_ED_FT
Please follow up with you primary care physician  Take Prednisone 50 mg one pill a day for 5 days.  Use albuterol MDI 2 puffs every  4-6 hours next 2 days  Tessalon Perles 200 mg every 6-8hrs as needed for cough

## 2023-08-10 NOTE — ED PROVIDER NOTE - OBJECTIVE STATEMENT
68-year-old female with history of rheumatoid arthritis elevated cholesterol hypertension presents with cough for about 6 weeks.    Patient states she has persistent cough that is causing chest pain stomach discomfort and back pain also complaining of associated nausea.  States she occasionally feels short of breath and feverish.  Patient has not objectively measured any fever.  patient has followed up with PMD, was found to have pneumonia on x-ray.

## 2023-08-10 NOTE — ED ADULT TRIAGE NOTE - CHIEF COMPLAINT QUOTE
pt c/o worsening productive (white/yellow phlegm) cough for 1x wk. pt diagnosed with pneumonia 1x month ago. pt c/o chest tightness, ekg in prog.

## 2023-08-18 NOTE — ED ADULT TRIAGE NOTE - AS TEMP SITE
Called pt and informed her of her upcoming appointments. Pt verbalized understanding and appreciation.  
Pt called stating that she still has been having bad coughing episodes that have resulted in her blacking out. Has had 3 episodes since her last visit with you, one of which was while she was driving. Cough is mostly dry, pt only feels some rattling in her chest, but nothing comes up. Pt denies having had fever or any other symptoms. Wants to know what to do and if she can come in to be seen.  
oral

## 2023-09-13 NOTE — ED PROVIDER NOTE - DISCHARGE REVIEW MATERIAL PRESENTED
Tri Ingram's chief complaint for this visit includes:  Chief Complaint   Patient presents with    Consult     BL foot pain ~5 months      PCP: Charlene Cain    Referring Provider:  No referring provider defined for this encounter.    There were no vitals taken for this visit.  Mild Pain (3)        Allergies   Allergen Reactions    Adhesive Tape     Iodine Anaphylaxis     contrast dye    Monosodium Glutamate Nausea and Vomiting and Diarrhea    Penicillins Hives         Do you need any medication refills at today's visit?      
.

## 2023-09-21 ENCOUNTER — LABORATORY RESULT (OUTPATIENT)
Age: 68
End: 2023-09-21

## 2023-09-21 ENCOUNTER — NON-APPOINTMENT (OUTPATIENT)
Age: 68
End: 2023-09-21

## 2023-09-21 ENCOUNTER — APPOINTMENT (OUTPATIENT)
Dept: INTERNAL MEDICINE | Facility: CLINIC | Age: 68
End: 2023-09-21
Payer: MEDICARE

## 2023-09-21 VITALS
BODY MASS INDEX: 24.05 KG/M2 | OXYGEN SATURATION: 98 % | HEART RATE: 93 BPM | DIASTOLIC BLOOD PRESSURE: 84 MMHG | SYSTOLIC BLOOD PRESSURE: 138 MMHG | WEIGHT: 149 LBS | TEMPERATURE: 97.3 F

## 2023-09-21 DIAGNOSIS — M54.42 LUMBAGO WITH SCIATICA, LEFT SIDE: ICD-10-CM

## 2023-09-21 DIAGNOSIS — Z83.3 FAMILY HISTORY OF DIABETES MELLITUS: ICD-10-CM

## 2023-09-21 DIAGNOSIS — R07.89 OTHER CHEST PAIN: ICD-10-CM

## 2023-09-21 DIAGNOSIS — Z91.89 OTHER SPECIFIED PERSONAL RISK FACTORS, NOT ELSEWHERE CLASSIFIED: ICD-10-CM

## 2023-09-21 DIAGNOSIS — J45.901 UNSPECIFIED ASTHMA WITH (ACUTE) EXACERBATION: ICD-10-CM

## 2023-09-21 DIAGNOSIS — Z82.49 FAMILY HISTORY OF ISCHEMIC HEART DISEASE AND OTHER DISEASES OF THE CIRCULATORY SYSTEM: ICD-10-CM

## 2023-09-21 DIAGNOSIS — R07.81 PLEURODYNIA: ICD-10-CM

## 2023-09-21 DIAGNOSIS — G89.29 LUMBAGO WITH SCIATICA, LEFT SIDE: ICD-10-CM

## 2023-09-21 DIAGNOSIS — Z87.01 PERSONAL HISTORY OF PNEUMONIA (RECURRENT): ICD-10-CM

## 2023-09-21 PROCEDURE — 93000 ELECTROCARDIOGRAM COMPLETE: CPT

## 2023-09-21 PROCEDURE — G0438: CPT

## 2023-09-21 PROCEDURE — 36415 COLL VENOUS BLD VENIPUNCTURE: CPT

## 2023-09-21 PROCEDURE — 99204 OFFICE O/P NEW MOD 45 MIN: CPT | Mod: 25

## 2023-09-21 RX ORDER — ALBUTEROL SULFATE 90 UG/1
108 (90 BASE) INHALANT RESPIRATORY (INHALATION)
Qty: 1 | Refills: 2 | Status: ACTIVE | COMMUNITY
Start: 2023-09-21 | End: 1900-01-01

## 2023-09-21 RX ORDER — VALSARTAN 80 MG
80 CAPSULE ORAL
Refills: 0 | Status: ACTIVE | COMMUNITY

## 2023-09-21 RX ORDER — ALBUTEROL SULFATE 90 UG/1
108 (90 BASE) AEROSOL, METERED RESPIRATORY (INHALATION)
Refills: 0 | Status: DISCONTINUED | COMMUNITY
End: 2023-09-21

## 2023-09-21 RX ORDER — HYDROXYCHLOROQUINE SULFATE 200 MG/1
200 TABLET, FILM COATED ORAL
Refills: 0 | Status: ACTIVE | COMMUNITY

## 2023-09-21 RX ORDER — EZETIMIBE 10 MG/1
10 TABLET ORAL
Refills: 0 | Status: ACTIVE | COMMUNITY

## 2023-09-21 RX ORDER — FAMOTIDINE 20 MG/1
20 TABLET, FILM COATED ORAL
Refills: 0 | Status: ACTIVE | COMMUNITY

## 2023-09-21 RX ORDER — VALSARTAN 80 MG/1
80 TABLET, COATED ORAL DAILY
Qty: 90 | Refills: 1 | Status: ACTIVE | COMMUNITY
Start: 2023-09-21 | End: 1900-01-01

## 2023-09-22 LAB
ALBUMIN SERPL ELPH-MCNC: 4.1 G/DL
ALP BLD-CCNC: 62 U/L
ALT SERPL-CCNC: 12 U/L
ANION GAP SERPL CALC-SCNC: 13 MMOL/L
AST SERPL-CCNC: 25 U/L
BASOPHILS # BLD AUTO: 0 K/UL
BASOPHILS NFR BLD AUTO: 0 %
BILIRUB SERPL-MCNC: 0.4 MG/DL
BUN SERPL-MCNC: 12 MG/DL
CALCIUM SERPL-MCNC: 10 MG/DL
CHLORIDE SERPL-SCNC: 101 MMOL/L
CHOLEST SERPL-MCNC: 224 MG/DL
CO2 SERPL-SCNC: 26 MMOL/L
CREAT SERPL-MCNC: 0.83 MG/DL
EGFR: 78 ML/MIN/1.73M2
EOSINOPHIL # BLD AUTO: 0 K/UL
EOSINOPHIL NFR BLD AUTO: 0 %
ESTIMATED AVERAGE GLUCOSE: 123 MG/DL
GLUCOSE SERPL-MCNC: 75 MG/DL
HBA1C MFR BLD HPLC: 5.9 %
HCT VFR BLD CALC: 42.2 %
HDLC SERPL-MCNC: 53 MG/DL
HGB BLD-MCNC: 13.1 G/DL
LDLC SERPL CALC-MCNC: 143 MG/DL
LYMPHOCYTES # BLD AUTO: 0.33 K/UL
LYMPHOCYTES NFR BLD AUTO: 8.7 %
MAN DIFF?: NORMAL
MCHC RBC-ENTMCNC: 29.1 PG
MCHC RBC-ENTMCNC: 31 GM/DL
MCV RBC AUTO: 93.8 FL
MONOCYTES # BLD AUTO: 0.26 K/UL
MONOCYTES NFR BLD AUTO: 6.9 %
NEUTROPHILS # BLD AUTO: 3.14 K/UL
NEUTROPHILS NFR BLD AUTO: 83.5 %
NONHDLC SERPL-MCNC: 172 MG/DL
PLATELET # BLD AUTO: 265 K/UL
POTASSIUM SERPL-SCNC: 3.9 MMOL/L
PROT SERPL-MCNC: 7.1 G/DL
RBC # BLD: 4.5 M/UL
RBC # FLD: 13.1 %
SODIUM SERPL-SCNC: 140 MMOL/L
TRIGL SERPL-MCNC: 160 MG/DL
WBC # FLD AUTO: 3.76 K/UL

## 2023-09-29 ENCOUNTER — APPOINTMENT (OUTPATIENT)
Dept: HEART AND VASCULAR | Facility: CLINIC | Age: 68
End: 2023-09-29
Payer: MEDICARE

## 2023-09-29 ENCOUNTER — NON-APPOINTMENT (OUTPATIENT)
Age: 68
End: 2023-09-29

## 2023-09-29 VITALS
WEIGHT: 149 LBS | HEART RATE: 83 BPM | BODY MASS INDEX: 23.95 KG/M2 | SYSTOLIC BLOOD PRESSURE: 135 MMHG | HEIGHT: 66 IN | DIASTOLIC BLOOD PRESSURE: 79 MMHG | OXYGEN SATURATION: 98 % | TEMPERATURE: 98.3 F

## 2023-09-29 DIAGNOSIS — I10 ESSENTIAL (PRIMARY) HYPERTENSION: ICD-10-CM

## 2023-09-29 DIAGNOSIS — R07.9 CHEST PAIN, UNSPECIFIED: ICD-10-CM

## 2023-09-29 DIAGNOSIS — M06.9 RHEUMATOID ARTHRITIS, UNSPECIFIED: ICD-10-CM

## 2023-09-29 DIAGNOSIS — E78.5 HYPERLIPIDEMIA, UNSPECIFIED: ICD-10-CM

## 2023-09-29 PROCEDURE — 93306 TTE W/DOPPLER COMPLETE: CPT

## 2023-09-29 PROCEDURE — 93000 ELECTROCARDIOGRAM COMPLETE: CPT

## 2023-09-29 PROCEDURE — 99213 OFFICE O/P EST LOW 20 MIN: CPT

## 2023-12-13 NOTE — ED PROVIDER NOTE - CARE PLAN
PRN Medication Follow-up Note:    Behavior:    Patient (Chon Parham is a 36 y.o. male, : 1987, MRN: [id number])     Allergies: Patient has no known allergies.    Chon's  height is 6' (1.829 m) and weight is 77.1 kg (170 lb). His oral temperature is 98.4 °F (36.9 °C). His blood pressure is 124/84 and his pulse is 70. His respiration is 20 and oxygen saturation is 98%.     Administered trazodone,atarax_______ per physician order to Chon       Intervention:    Intervention to Chon's response: none needed_________.       Response:    Chon's response: no further c/o voiced________      Plan:     Continue to monitor per MD/PA/APRN orders; and reevaluate medication effectiveness within 30 minutes.    1 Principal Discharge DX:	Pain, dental

## 2024-01-11 ENCOUNTER — EMERGENCY (EMERGENCY)
Facility: HOSPITAL | Age: 69
LOS: 1 days | Discharge: HOME | End: 2024-01-11
Attending: EMERGENCY MEDICINE | Admitting: EMERGENCY MEDICINE
Payer: MEDICARE

## 2024-01-11 VITALS
SYSTOLIC BLOOD PRESSURE: 121 MMHG | RESPIRATION RATE: 17 BRPM | TEMPERATURE: 98 F | DIASTOLIC BLOOD PRESSURE: 73 MMHG | HEART RATE: 95 BPM | OXYGEN SATURATION: 98 %

## 2024-01-11 VITALS
SYSTOLIC BLOOD PRESSURE: 126 MMHG | DIASTOLIC BLOOD PRESSURE: 79 MMHG | RESPIRATION RATE: 18 BRPM | TEMPERATURE: 98 F | HEIGHT: 66 IN | WEIGHT: 145.06 LBS | OXYGEN SATURATION: 97 % | HEART RATE: 96 BPM

## 2024-01-11 DIAGNOSIS — J45.901 UNSPECIFIED ASTHMA WITH (ACUTE) EXACERBATION: ICD-10-CM

## 2024-01-11 DIAGNOSIS — R06.02 SHORTNESS OF BREATH: ICD-10-CM

## 2024-01-11 DIAGNOSIS — Z90.49 ACQUIRED ABSENCE OF OTHER SPECIFIED PARTS OF DIGESTIVE TRACT: Chronic | ICD-10-CM

## 2024-01-11 DIAGNOSIS — Z91.013 ALLERGY TO SEAFOOD: ICD-10-CM

## 2024-01-11 DIAGNOSIS — Z88.0 ALLERGY STATUS TO PENICILLIN: ICD-10-CM

## 2024-01-11 DIAGNOSIS — I10 ESSENTIAL (PRIMARY) HYPERTENSION: ICD-10-CM

## 2024-01-11 PROCEDURE — 99285 EMERGENCY DEPT VISIT HI MDM: CPT | Mod: 25

## 2024-01-11 PROCEDURE — 71046 X-RAY EXAM CHEST 2 VIEWS: CPT

## 2024-01-11 PROCEDURE — 94640 AIRWAY INHALATION TREATMENT: CPT

## 2024-01-11 PROCEDURE — 99284 EMERGENCY DEPT VISIT MOD MDM: CPT

## 2024-01-11 PROCEDURE — 71046 X-RAY EXAM CHEST 2 VIEWS: CPT | Mod: 26

## 2024-01-11 RX ORDER — FLUTICASONE PROPIONATE AND SALMETEROL 50; 250 UG/1; UG/1
1 POWDER ORAL; RESPIRATORY (INHALATION)
Qty: 1 | Refills: 0
Start: 2024-01-11

## 2024-01-11 RX ORDER — IPRATROPIUM/ALBUTEROL SULFATE 18-103MCG
3 AEROSOL WITH ADAPTER (GRAM) INHALATION
Refills: 0 | Status: COMPLETED | OUTPATIENT
Start: 2024-01-11 | End: 2024-01-11

## 2024-01-11 RX ORDER — ALBUTEROL 90 UG/1
3 AEROSOL, METERED ORAL
Qty: 30 | Refills: 0
Start: 2024-01-11

## 2024-01-11 RX ORDER — ALBUTEROL 90 UG/1
2 AEROSOL, METERED ORAL
Qty: 1 | Refills: 0
Start: 2024-01-11

## 2024-01-11 RX ORDER — FLUTICASONE PROPIONATE AND SALMETEROL 50; 250 UG/1; UG/1
2 POWDER ORAL; RESPIRATORY (INHALATION)
Qty: 1 | Refills: 0
Start: 2024-01-11

## 2024-01-11 RX ADMIN — Medication 60 MILLIGRAM(S): at 13:47

## 2024-01-11 RX ADMIN — Medication 3 MILLILITER(S): at 14:30

## 2024-01-11 RX ADMIN — Medication 3 MILLILITER(S): at 13:47

## 2024-01-11 RX ADMIN — Medication 3 MILLILITER(S): at 14:13

## 2024-01-11 NOTE — ED ADULT TRIAGE NOTE - CHIEF COMPLAINT QUOTE
pt w/ hx asthma presents to ER c/o sob, chest tightness, and occasional lightheadedness and chest pain for the past week. states she had covid two weeks ago.

## 2024-01-11 NOTE — ED PROVIDER NOTE - CLINICAL SUMMARY MEDICAL DECISION MAKING FREE TEXT BOX
here w/ likely asthma exacerbation in setting of recent infection  meds ordered for home, to f/u pulm  DC home in NAD with strict return precautions given.

## 2024-01-11 NOTE — ED ADULT NURSE NOTE - OBJECTIVE STATEMENT
68yF pmhx asthma (prior hospitalizations, no intubations), HTN, presents to ER complaining of 1 weeks of SOB. Pt stated that she recovered from Covid 2 weeks ago since has been feeling SOB constantly since, worse with exertion and speaking. Reports accompanying CP with deep breath. Denies N/V/D, F/C, nonproductive cough. No relief with albuterol inhaler.

## 2024-01-11 NOTE — ED ADULT NURSE NOTE - NSFALLUNIVINTERV_ED_ALL_ED
Bed/Stretcher in lowest position, wheels locked, appropriate side rails in place/Call bell, personal items and telephone in reach/Instruct patient to call for assistance before getting out of bed/chair/stretcher/Non-slip footwear applied when patient is off stretcher/[place] to call system/Physically safe environment - no spills, clutter or unnecessary equipment/Purposeful proactive rounding/Room/bathroom lighting operational, light cord in reach Bed/Stretcher in lowest position, wheels locked, appropriate side rails in place/Call bell, personal items and telephone in reach/Instruct patient to call for assistance before getting out of bed/chair/stretcher/Non-slip footwear applied when patient is off stretcher/[place] to call system/Physically safe environment - no spills, clutter or unnecessary equipment/Purposeful proactive rounding/Room/bathroom lighting operational, light cord in reach

## 2024-01-11 NOTE — ED PROVIDER NOTE - OBJECTIVE STATEMENT
· HPI Objective Statement: 68yF pmhx asthma (prior hospitalizations, no intubations), HTN, presents to ER complaining of 1 weeks of SOB. Pt stated that she recovered from Covid 2 weeks ago since has been feeling SOB constantly since, worse with exertion and speaking. Reports accompanying CP with deep breath. Denies N/V/D, F/C, nonproductive cough. No relief with albuterol inhaler.  · Presenting Symptoms: CHEST PAIN, DYSPNEA ON EXERTION, SHORTNESS OF BREATH  · Negative Findings: no body aches, no chills, no edema, no fever, no headache, no hemoptysis  · Timing: constant  · Duration: week(s)  · Context: unknown  · Recent Exposure To: none known  · Aggravated Factors: breathing deeply, walking  · Relieving Factors: none

## 2024-01-11 NOTE — ED PROVIDER NOTE - PATIENT PORTAL LINK FT
You can access the FollowMyHealth Patient Portal offered by [facility] by registering at the following website: [email or web address]. By joining [employer]’s FollowMyHealth portal, you will also be able to view your health information using other applications (apps) compatible with our system. You can access the FollowMyHealth Patient Portal offered by [facility] by registering at the following website: [email or web address]. By joining [employer]’s FollowMyHealth portal, you will also be able to view your health information using other applications (apps) compatible with our system.

## 2024-01-11 NOTE — ED PROVIDER NOTE - PHYSICAL EXAMINATION
CONSTITUTIONAL: Well-appearing; well-nourished; in no apparent distress.   HEAD: Normocephalic; atraumatic.   EYES:  conjunctiva and sclera clear  ENT: normal nose; no rhinorrhea;  NECK: Supple; full ROM  RESPIRATORY: Breathing easily; no resp difficulty, +wheezes on exam  EXT: No cyanosis or edema;  SKIN: Normal for age and race; warm; dry; good turgor; no apparent lesions or rash.   NEURO: A & O x 3; face symmetric; grossly unremarkable.   PSYCHOLOGICAL: The patient’s mood and manner are appropriate.

## 2024-01-11 NOTE — ED ADULT NURSE NOTE - SINCE THE ILLNESS OR INJURY
[Alert] : alert [Healthy Appearance] : healthy appearance [Well Nourished] : well nourished [No Acute Distress] : no acute distress [Well Developed] : well developed [Normal Voice/Communication] : normal voice communication [Normal Pupil & Iris Size/Symmetry] : normal pupil and iris size and symmetry [No Discharge] : no discharge [No Photophobia] : no photophobia [Sclera Not Icteric] : sclera not icteric [Normal Nasal Mucosa] : the nasal mucosa was normal [Normal TMs] : both tympanic membranes were normal [Normal Lips/Tongue] : the lips and tongue were normal [No Nasal Discharge] : no nasal discharge [Normal Outer Ear/Nose] : the ears and nose were normal in appearance [No Thrush] : no thrush [Normal Tonsils] : normal tonsils [No Oral Lesions or Ulcers] : no oral lesions or ulcers [Normal Dentition] : normal dentition [Pharyngeal erythema] : pharyngeal erythema [Exudate] : no exudate [Supple] : the neck was supple [Normal Rate and Effort] : normal respiratory rhythm and effort [Normal Palpation] : palpation of the chest revealed no abnormalities [No Crackles] : no crackles [No Retractions] : no retractions [Normal Rate] : heart rate was normal  [Bilateral Audible Breath Sounds] : bilateral audible breath sounds [Normal S1, S2] : normal S1 and S2 [Regular Rhythm] : with a regular rhythm [No murmur] : no murmur [Soft] : abdomen soft [Not Tender] : non-tender [Not Distended] : not distended [No HSM] : no hepato-splenomegaly [Normal Cervical Lymph Nodes] : cervical [Skin Intact] : skin intact  [No Rash] : no rash [No Skin Lesions] : no skin lesions [No Edema] : no edema [No Joint Swelling or Erythema] : no joint swelling or erythema [No clubbing] : no clubbing [No Cyanosis] : no cyanosis [Normal Mood] : mood was normal [Normal Affect] : affect was normal [Alert, Awake, Oriented as Age-Appropriate] : alert, awake, oriented as age appropriate [de-identified] : mild post nasal drip, palpable L tonsil No

## 2024-02-08 ENCOUNTER — EMERGENCY (EMERGENCY)
Facility: HOSPITAL | Age: 69
LOS: 1 days | Discharge: HOME | End: 2024-02-08
Attending: STUDENT IN AN ORGANIZED HEALTH CARE EDUCATION/TRAINING PROGRAM | Admitting: STUDENT IN AN ORGANIZED HEALTH CARE EDUCATION/TRAINING PROGRAM
Payer: MEDICARE

## 2024-02-08 VITALS
SYSTOLIC BLOOD PRESSURE: 130 MMHG | TEMPERATURE: 99 F | OXYGEN SATURATION: 97 % | RESPIRATION RATE: 20 BRPM | HEIGHT: 66 IN | WEIGHT: 145.06 LBS | HEART RATE: 98 BPM | DIASTOLIC BLOOD PRESSURE: 76 MMHG

## 2024-02-08 VITALS
DIASTOLIC BLOOD PRESSURE: 70 MMHG | RESPIRATION RATE: 19 BRPM | SYSTOLIC BLOOD PRESSURE: 112 MMHG | OXYGEN SATURATION: 98 % | TEMPERATURE: 99 F | HEART RATE: 72 BPM

## 2024-02-08 DIAGNOSIS — I10 ESSENTIAL (PRIMARY) HYPERTENSION: ICD-10-CM

## 2024-02-08 DIAGNOSIS — R10.9 UNSPECIFIED ABDOMINAL PAIN: ICD-10-CM

## 2024-02-08 DIAGNOSIS — Z90.49 ACQUIRED ABSENCE OF OTHER SPECIFIED PARTS OF DIGESTIVE TRACT: Chronic | ICD-10-CM

## 2024-02-08 DIAGNOSIS — R19.7 DIARRHEA, UNSPECIFIED: ICD-10-CM

## 2024-02-08 DIAGNOSIS — E78.5 HYPERLIPIDEMIA, UNSPECIFIED: ICD-10-CM

## 2024-02-08 DIAGNOSIS — Z88.0 ALLERGY STATUS TO PENICILLIN: ICD-10-CM

## 2024-02-08 DIAGNOSIS — R51.9 HEADACHE, UNSPECIFIED: ICD-10-CM

## 2024-02-08 DIAGNOSIS — Z91.013 ALLERGY TO SEAFOOD: ICD-10-CM

## 2024-02-08 DIAGNOSIS — M10.9 GOUT, UNSPECIFIED: ICD-10-CM

## 2024-02-08 DIAGNOSIS — R11.10 VOMITING, UNSPECIFIED: ICD-10-CM

## 2024-02-08 DIAGNOSIS — Z20.822 CONTACT WITH AND (SUSPECTED) EXPOSURE TO COVID-19: ICD-10-CM

## 2024-02-08 DIAGNOSIS — M06.9 RHEUMATOID ARTHRITIS, UNSPECIFIED: ICD-10-CM

## 2024-02-08 LAB
ALBUMIN SERPL ELPH-MCNC: 3.6 G/DL — SIGNIFICANT CHANGE UP (ref 3.3–5)
ALP SERPL-CCNC: 59 U/L — SIGNIFICANT CHANGE UP (ref 40–120)
ALT FLD-CCNC: 16 U/L — SIGNIFICANT CHANGE UP (ref 10–45)
ANION GAP SERPL CALC-SCNC: 8 MMOL/L — SIGNIFICANT CHANGE UP (ref 5–17)
ANISOCYTOSIS BLD QL: SLIGHT — SIGNIFICANT CHANGE UP
AST SERPL-CCNC: 31 U/L — SIGNIFICANT CHANGE UP (ref 10–40)
BASOPHILS # BLD AUTO: 0 K/UL — SIGNIFICANT CHANGE UP (ref 0–0.2)
BASOPHILS NFR BLD AUTO: 0 % — SIGNIFICANT CHANGE UP (ref 0–2)
BILIRUB SERPL-MCNC: 0.5 MG/DL — SIGNIFICANT CHANGE UP (ref 0.2–1.2)
BUN SERPL-MCNC: 13 MG/DL — SIGNIFICANT CHANGE UP (ref 7–23)
CALCIUM SERPL-MCNC: 9.4 MG/DL — SIGNIFICANT CHANGE UP (ref 8.4–10.5)
CHLORIDE SERPL-SCNC: 102 MMOL/L — SIGNIFICANT CHANGE UP (ref 96–108)
CO2 SERPL-SCNC: 25 MMOL/L — SIGNIFICANT CHANGE UP (ref 22–31)
CREAT SERPL-MCNC: 0.83 MG/DL — SIGNIFICANT CHANGE UP (ref 0.5–1.3)
DACRYOCYTES BLD QL SMEAR: SLIGHT — SIGNIFICANT CHANGE UP
EGFR: 78 ML/MIN/1.73M2 — SIGNIFICANT CHANGE UP
EOSINOPHIL # BLD AUTO: 0.07 K/UL — SIGNIFICANT CHANGE UP (ref 0–0.5)
EOSINOPHIL NFR BLD AUTO: 2.6 % — SIGNIFICANT CHANGE UP (ref 0–6)
FLUAV AG NPH QL: SIGNIFICANT CHANGE UP
FLUBV AG NPH QL: SIGNIFICANT CHANGE UP
GLUCOSE SERPL-MCNC: 142 MG/DL — HIGH (ref 70–99)
HCT VFR BLD CALC: 39.1 % — SIGNIFICANT CHANGE UP (ref 34.5–45)
HGB BLD-MCNC: 12.5 G/DL — SIGNIFICANT CHANGE UP (ref 11.5–15.5)
LIDOCAIN IGE QN: 13 U/L — SIGNIFICANT CHANGE UP (ref 7–60)
LYMPHOCYTES # BLD AUTO: 0.36 K/UL — LOW (ref 1–3.3)
LYMPHOCYTES # BLD AUTO: 13.9 % — SIGNIFICANT CHANGE UP (ref 13–44)
MACROCYTES BLD QL: SLIGHT — SIGNIFICANT CHANGE UP
MANUAL SMEAR VERIFICATION: SIGNIFICANT CHANGE UP
MCHC RBC-ENTMCNC: 30.3 PG — SIGNIFICANT CHANGE UP (ref 27–34)
MCHC RBC-ENTMCNC: 32 GM/DL — SIGNIFICANT CHANGE UP (ref 32–36)
MCV RBC AUTO: 94.7 FL — SIGNIFICANT CHANGE UP (ref 80–100)
MICROCYTES BLD QL: SLIGHT — SIGNIFICANT CHANGE UP
MONOCYTES # BLD AUTO: 0.13 K/UL — SIGNIFICANT CHANGE UP (ref 0–0.9)
MONOCYTES NFR BLD AUTO: 5.2 % — SIGNIFICANT CHANGE UP (ref 2–14)
NEUTROPHILS # BLD AUTO: 2.02 K/UL — SIGNIFICANT CHANGE UP (ref 1.8–7.4)
NEUTROPHILS NFR BLD AUTO: 78.3 % — HIGH (ref 43–77)
OVALOCYTES BLD QL SMEAR: SLIGHT — SIGNIFICANT CHANGE UP
PLAT MORPH BLD: ABNORMAL
PLATELET # BLD AUTO: 268 K/UL — SIGNIFICANT CHANGE UP (ref 150–400)
POIKILOCYTOSIS BLD QL AUTO: SLIGHT — SIGNIFICANT CHANGE UP
POLYCHROMASIA BLD QL SMEAR: SLIGHT — SIGNIFICANT CHANGE UP
POTASSIUM SERPL-MCNC: 3.4 MMOL/L — LOW (ref 3.5–5.3)
POTASSIUM SERPL-SCNC: 3.4 MMOL/L — LOW (ref 3.5–5.3)
PROT SERPL-MCNC: 6.6 G/DL — SIGNIFICANT CHANGE UP (ref 6–8.3)
RBC # BLD: 4.13 M/UL — SIGNIFICANT CHANGE UP (ref 3.8–5.2)
RBC # FLD: 13 % — SIGNIFICANT CHANGE UP (ref 10.3–14.5)
RBC BLD AUTO: ABNORMAL
RSV RNA NPH QL NAA+NON-PROBE: SIGNIFICANT CHANGE UP
SARS-COV-2 RNA SPEC QL NAA+PROBE: SIGNIFICANT CHANGE UP
SCHISTOCYTES BLD QL AUTO: SLIGHT — SIGNIFICANT CHANGE UP
SODIUM SERPL-SCNC: 135 MMOL/L — SIGNIFICANT CHANGE UP (ref 135–145)
WBC # BLD: 2.58 K/UL — LOW (ref 3.8–10.5)
WBC # FLD AUTO: 2.58 K/UL — LOW (ref 3.8–10.5)

## 2024-02-08 PROCEDURE — 36415 COLL VENOUS BLD VENIPUNCTURE: CPT

## 2024-02-08 PROCEDURE — 99284 EMERGENCY DEPT VISIT MOD MDM: CPT

## 2024-02-08 PROCEDURE — 80053 COMPREHEN METABOLIC PANEL: CPT

## 2024-02-08 PROCEDURE — 96375 TX/PRO/DX INJ NEW DRUG ADDON: CPT

## 2024-02-08 PROCEDURE — 87637 SARSCOV2&INF A&B&RSV AMP PRB: CPT

## 2024-02-08 PROCEDURE — 83690 ASSAY OF LIPASE: CPT

## 2024-02-08 PROCEDURE — 85025 COMPLETE CBC W/AUTO DIFF WBC: CPT

## 2024-02-08 PROCEDURE — 99284 EMERGENCY DEPT VISIT MOD MDM: CPT | Mod: 25

## 2024-02-08 PROCEDURE — 96374 THER/PROPH/DIAG INJ IV PUSH: CPT

## 2024-02-08 RX ORDER — SODIUM CHLORIDE 9 MG/ML
1000 INJECTION, SOLUTION INTRAVENOUS ONCE
Refills: 0 | Status: COMPLETED | OUTPATIENT
Start: 2024-02-08 | End: 2024-02-08

## 2024-02-08 RX ORDER — SIMETHICONE 80 MG/1
80 TABLET, CHEWABLE ORAL ONCE
Refills: 0 | Status: COMPLETED | OUTPATIENT
Start: 2024-02-08 | End: 2024-02-08

## 2024-02-08 RX ORDER — FAMOTIDINE 10 MG/ML
20 INJECTION INTRAVENOUS ONCE
Refills: 0 | Status: COMPLETED | OUTPATIENT
Start: 2024-02-08 | End: 2024-02-08

## 2024-02-08 RX ORDER — KETOROLAC TROMETHAMINE 30 MG/ML
15 SYRINGE (ML) INJECTION ONCE
Refills: 0 | Status: DISCONTINUED | OUTPATIENT
Start: 2024-02-08 | End: 2024-02-08

## 2024-02-08 RX ORDER — ONDANSETRON 8 MG/1
4 TABLET, FILM COATED ORAL ONCE
Refills: 0 | Status: COMPLETED | OUTPATIENT
Start: 2024-02-08 | End: 2024-02-08

## 2024-02-08 RX ORDER — ACETAMINOPHEN 500 MG
650 TABLET ORAL ONCE
Refills: 0 | Status: COMPLETED | OUTPATIENT
Start: 2024-02-08 | End: 2024-02-08

## 2024-02-08 RX ADMIN — Medication 650 MILLIGRAM(S): at 18:51

## 2024-02-08 RX ADMIN — Medication 15 MILLIGRAM(S): at 20:42

## 2024-02-08 RX ADMIN — ONDANSETRON 4 MILLIGRAM(S): 8 TABLET, FILM COATED ORAL at 19:34

## 2024-02-08 RX ADMIN — FAMOTIDINE 20 MILLIGRAM(S): 10 INJECTION INTRAVENOUS at 19:34

## 2024-02-08 RX ADMIN — SODIUM CHLORIDE 1000 MILLILITER(S): 9 INJECTION, SOLUTION INTRAVENOUS at 19:34

## 2024-02-08 RX ADMIN — SIMETHICONE 80 MILLIGRAM(S): 80 TABLET, CHEWABLE ORAL at 20:06

## 2024-02-08 NOTE — ED PROVIDER NOTE - PATIENT PORTAL LINK FT
You can access the FollowMyHealth Patient Portal offered by [facility] by registering at the following website: [email or web address]. By joining [employer]’s FollowMyHealth portal, you will also be able to view your health information using other applications (apps) compatible with our system.

## 2024-02-08 NOTE — ED ADULT NURSE NOTE - OTHER COMPLAINTS
Patient arrived to the ER c/o HA and feeling lightheaded with an onset of yesterday @3pm. Pt also reports having vomiting/diarrhea last night. Pt denies any other medical complaints at this moment. Pt is noted to be aox3, able to maintain airway, having nonlabored breathing, mobile with steady gait, no retractions noted, non diaphoretic and able to talk in clear full sentences,

## 2024-02-08 NOTE — ED ADULT NURSE NOTE - OBJECTIVE STATEMENT
Pt is a 69y/o M presenting to the ED w/ c/o of HA, lightheadedness, 1x episode of non-bloody emesis, 3x episodes of non-bloody diarrhea, RUQ pain, chills (unsure of fever), non-productive cough, nasal congestion that started yesterday @ 3pm. Pt denies CP, SOB, dizziness, numbness/tingling, recent falls @ home, blurry vision, weakness, decreased eating/drinking. Pt was not vaxx for flu this years, states has been around people with the "stomach bug" recently. Pt has PMHx RA (associated w/ chronic pain), high cholesterol, HTN. Uses cane @ home. Pt A/Ox3, speaking in clear/complete sentences. Respirations easy/even and unlabored on RA. Pt resting comfortably in chair. Swab obtained.

## 2024-02-08 NOTE — ED ADULT TRIAGE NOTE - PAIN: PRESENCE, MLM
complains of pain/discomfort The Delivery OB Provider certifies that vaginal examination and/or abdominal examination after the delivery was done and no foreign body was found.

## 2024-02-08 NOTE — ED PROVIDER NOTE - NSFOLLOWUPINSTRUCTIONS_ED_ALL_ED_FT
STAY HYDRATED MAKE SURE TO HAVE PEDIALYTE OR GATORADE     Viral Gastroenteritis, Adult       Viral gastroenteritis is also known as the stomach flu. This condition may affect your stomach, small intestine, and large intestine. It can cause sudden watery diarrhea, fever, and vomiting. This condition is caused by many different viruses. These viruses can be passed from person to person very easily (are contagious).    Diarrhea and vomiting can make you feel weak and cause you to become dehydrated. You may not be able to keep fluids down. Dehydration can make you tired and thirsty, cause you to have a dry mouth, and decrease how often you urinate. It is important to replace the fluids that you lose from diarrhea and vomiting.      What are the causes?    Gastroenteritis is caused by many viruses, including rotavirus and norovirus. Norovirus is the most common cause in adults. You can get sick after being exposed to the viruses from other people. You can also get sick by:  •Eating food, drinking water, or touching a surface contaminated with one of these viruses.      •Sharing utensils or other personal items with an infected person.        What increases the risk?    You are more likely to develop this condition if you:  •Have a weak body defense system (immune system).      •Live with one or more children who are younger than 2 years old.      •Live in a nursing home.      •Travel on cruise ships.        What are the signs or symptoms?    Symptoms of this condition start suddenly 1–3 days after exposure to a virus. Symptoms may last for a few days or for as long as a week. Common symptoms include watery diarrhea and vomiting. Other symptoms include:  •Fever.      •Headache.      •Fatigue.      •Pain in the abdomen.      •Chills.      •Weakness.      •Nausea.      •Muscle aches.      •Loss of appetite.        How is this diagnosed?    This condition is diagnosed with a medical history and physical exam. You may also have a stool test to check for viruses or other infections.      How is this treated?    This condition typically goes away on its own. The focus of treatment is to prevent dehydration and restore lost fluids (rehydration). This condition may be treated with:  •An oral rehydration solution (ORS) to replace important salts and minerals (electrolytes) in your body. Take this if told by your health care provider. This is a drink that is sold at pharmacies and retail stores.      •Medicines to help with your symptoms.      •Probiotic supplements to reduce symptoms of diarrhea.      •Fluids given through an IV, if dehydration is severe.      Older adults and people with other diseases or a weak immune system are at higher risk for dehydration.      Follow these instructions at home:       Eating and drinking      •Take an ORS as told by your health care provider.    •Drink clear fluids in small amounts as you are able. Clear fluids include:  •Water.      •Ice chips.      •Diluted fruit juice.      •Low-calorie sports drinks.        •Drink enough fluid to keep your urine pale yellow.      •Eat small amounts of healthy foods every 3–4 hours as you are able. This may include whole grains, fruits, vegetables, lean meats, and yogurt.      •Avoid fluids that contain a lot of sugar or caffeine, such as energy drinks, sports drinks, and soda.      •Avoid spicy or fatty foods.      •Avoid alcohol.      General instructions     •Wash your hands often, especially after having diarrhea or vomiting. If soap and water are not available, use hand .      •Make sure that all people in your household wash their hands well and often.      •Take over-the-counter and prescription medicines only as told by your health care provider.      •Rest at home while you recover.      •Watch your condition for any changes.      •Take a warm bath to relieve any burning or pain from frequent diarrhea episodes.      •Keep all follow-up visits as told by your health care provider. This is important.        Contact a health care provider if you:    •Cannot keep fluids down.      •Have symptoms that get worse.      •Have new symptoms.      •Feel light-headed or dizzy.      •Have muscle cramps.        Get help right away if you:    •Have chest pain.      •Feel extremely weak or you faint.      •See blood in your vomit.      •Have vomit that looks like coffee grounds.      •Have bloody or black stools or stools that look like tar.      •Have a severe headache, a stiff neck, or both.      •Have a rash.      •Have severe pain, cramping, or bloating in your abdomen.      •Have trouble breathing or you are breathing very quickly.      •Have a fast heartbeat.      •Have skin that feels cold and clammy.      •Feel confused.      •Have pain when you urinate.    •Have signs of dehydration, such as:  •Dark urine, very little urine, or no urine.      •Cracked lips.      •Dry mouth.      •Sunken eyes.      •Sleepiness.      •Weakness.          Summary    •Viral gastroenteritis is also known as the stomach flu. It can cause sudden watery diarrhea, fever, and vomiting.      •This condition can be passed from person to person very easily (is contagious).      •Take an ORS if told by your health care provider. This is a drink that is sold at pharmacies and retail stores.      •Wash your hands often, especially after having diarrhea or vomiting. If soap and water are not available, use hand .      This information is not intended to replace advice given to you by your health care provider. Make sure you discuss any questions you have with your health care provider.      Document Revised: 06/05/2020 Document Reviewed: 10/23/2019    Elsevier Patient Education © 2022 Elsevier Inc.

## 2024-02-08 NOTE — ED ADULT NURSE NOTE - AS PAIN REST
(June 2022) This is a 73 yo female here for chrnoic constipation.  She is on Linzess 72 mcg once a day was effective but now it seems as if she has developed a tolerance. She has been constipated most of her life but it got worse in 2019.   Linzess 145 sample were given which were effective but prescribed as needed.   She is concerned that she has a blockage or a motility issue.  Patient is uninsured as of 3 days ago and will need patient assistance for medication.  (August 2022) The patient is here for follow up of constipation.  Linzess 72 mcg at times causes diarrhea.  Miralax is taken for breakthrough but stool evacuation takes up to 24 hours.  She has never taken it on a regular basis.  Patient is doing well on pantoprazole 20 mg every other day. 3 (mild pain)

## 2024-02-08 NOTE — ED ADULT TRIAGE NOTE - OTHER COMPLAINTS
Patient arrived to the ER c/o HA and feeling lightheaded with an onset of yesterday @3pm. Pt also reports having vomiting/diarrhea last night. Pt denies any other medical complaints at this moment. Pt is noted to be aox3, able to maintain airway, having nonlabored breathing, mobile with steady gait, no retractions noted, non diaphoretic and able to talk in clear full sentences,
operating room

## 2024-02-08 NOTE — ED ADULT NURSE NOTE - BEFAST BALANCE
----- Message from Leonard Gan MD sent at 6/18/2023  5:52 PM CDT -----  Labs look okay -the protein level is normal.  The albumin is slightly low probably indicative poor nutrition.  Perhaps increase protein intake decrease carbs and add a multivitamin once a day.  
No

## 2024-02-08 NOTE — ED PROVIDER NOTE - CLINICAL SUMMARY MEDICAL DECISION MAKING FREE TEXT BOX
VS unremarkable here, Temp 99.3  On exam pt tired appearing not in distress, exam non-focal, abdomen soft and nontender  doubt acute surgical intra-abdominal pathology  suspect likely viral gastroenteritis as etiology of abd cramps/vomiting/diarrhea  suspect headache benign etiology 2/2 viral illness w/ fluid losses via diarrhea/vomiting and poor PO intake today  no neuro complaints or deficits, do not think emergent or dangerous cause of headache    will check basic labs and electrolytes, hydrate, symptomatic treatment, anticipate dc

## 2024-02-08 NOTE — ED PROVIDER NOTE - OBJECTIVE STATEMENT
68yF w/ gout, rheumatoid arthritis, HTN, HLD, prior appendectomy  comes in for eval- says last night started having diffuse abd cramping w/ few episodes nonbloody diarrhea, nausea, nbnb emesis. Today feeling fatigue w/ headache and lightheadedness. No fevers, chest pain, SOB, urinary symptoms. +multiple contacts w/ abd pain and diarrhea over last few days.

## 2024-02-08 NOTE — ED PROVIDER NOTE - PHYSICAL EXAMINATION
CONST: nontoxic NAD speaking in full sentences  HEAD: atraumatic  EYES: conjunctivae clear  NECK: supple  CARD: regular rate  CHEST: breathing comfortably, no stridor/retractions/tripoding  ABD: soft nontender nondistended  EXT: FROM  SKIN: warm, dry  NEURO: awake alert answering questions following commands moving all extremities no facial droop no gaze preference , speech is clear and fluent, steady gait

## 2024-03-26 ENCOUNTER — APPOINTMENT (OUTPATIENT)
Dept: RADIOLOGY | Facility: CLINIC | Age: 69
End: 2024-03-26
Payer: MEDICARE

## 2024-03-26 PROCEDURE — 71046 X-RAY EXAM CHEST 2 VIEWS: CPT

## 2024-03-26 PROCEDURE — 74019 RADEX ABDOMEN 2 VIEWS: CPT

## 2024-05-13 ENCOUNTER — EMERGENCY (EMERGENCY)
Facility: HOSPITAL | Age: 69
LOS: 1 days | Discharge: HOME | End: 2024-05-13
Admitting: EMERGENCY MEDICINE
Payer: MEDICARE

## 2024-05-13 VITALS
WEIGHT: 139.99 LBS | TEMPERATURE: 98 F | OXYGEN SATURATION: 97 % | DIASTOLIC BLOOD PRESSURE: 89 MMHG | HEART RATE: 78 BPM | SYSTOLIC BLOOD PRESSURE: 133 MMHG | RESPIRATION RATE: 16 BRPM | HEIGHT: 66 IN

## 2024-05-13 DIAGNOSIS — Z90.49 ACQUIRED ABSENCE OF OTHER SPECIFIED PARTS OF DIGESTIVE TRACT: Chronic | ICD-10-CM

## 2024-05-13 PROCEDURE — 73090 X-RAY EXAM OF FOREARM: CPT

## 2024-05-13 PROCEDURE — 99284 EMERGENCY DEPT VISIT MOD MDM: CPT | Mod: 25

## 2024-05-13 PROCEDURE — 73110 X-RAY EXAM OF WRIST: CPT | Mod: 26,RT

## 2024-05-13 PROCEDURE — 73030 X-RAY EXAM OF SHOULDER: CPT | Mod: 26,RT

## 2024-05-13 PROCEDURE — 73130 X-RAY EXAM OF HAND: CPT

## 2024-05-13 PROCEDURE — 73090 X-RAY EXAM OF FOREARM: CPT | Mod: 26,RT

## 2024-05-13 PROCEDURE — 73130 X-RAY EXAM OF HAND: CPT | Mod: 26,RT

## 2024-05-13 PROCEDURE — 73110 X-RAY EXAM OF WRIST: CPT

## 2024-05-13 PROCEDURE — 73030 X-RAY EXAM OF SHOULDER: CPT

## 2024-05-13 PROCEDURE — 99284 EMERGENCY DEPT VISIT MOD MDM: CPT

## 2024-05-13 RX ORDER — IBUPROFEN 200 MG
1 TABLET ORAL
Qty: 21 | Refills: 0
Start: 2024-05-13

## 2024-05-13 RX ORDER — IBUPROFEN 200 MG
600 TABLET ORAL ONCE
Refills: 0 | Status: COMPLETED | OUTPATIENT
Start: 2024-05-13 | End: 2024-05-13

## 2024-05-13 RX ADMIN — Medication 600 MILLIGRAM(S): at 12:49

## 2024-05-13 RX ADMIN — Medication 600 MILLIGRAM(S): at 11:49

## 2024-05-13 NOTE — ED PROVIDER NOTE - OBJECTIVE STATEMENT
68 y/o f hx RA presents s/p mechanical fall yesterday c/o pain to right arm.  Pt stating majority of pain is in the forearm, also has mild swelling on the back of her right hand.  Pain radiates up to her right shoulder which she also states she landed on.  Denies head trauma, numbness/tingling to ext, all other ROS negative.

## 2024-05-13 NOTE — ED PROVIDER NOTE - PATIENT PORTAL LINK FT
You can access the FollowMyHealth Patient Portal offered by [facility] by registering at the following website: [email or web address]. By joining [employer]’s FollowMyHealth portal, you will also be able to view your health information using other applications (apps) compatible with our system.

## 2024-05-13 NOTE — ED ADULT NURSE NOTE - NS ED PATIENT SAFETY CONCERN
Name of Procedure:  Right IJ Central Line Rewire    Date of Procedure: 8/23/19    Description of Procedure  The patient was placed in the supine position with head in neutral position. The bed was positioned in slight Trendelenburg. The existing MAC line catheter was prepped and sutures were removed. A guidewire was advanced through MAC line hub and the line was withdrawn while maintaining control of the wire. A triple lumen catheter was inserted over the guidewire while  maintaning constant control of the guidewire proximally during the insertion and distally while inserting the new catheter. The guidewire was subsequently removed. Adequate blood return was present in all three ports. Brisk oozing at the site was present, resolved with pressure at the site for 15 minutes and the use of Quickclot.   A Biopatch was placed at the skin entry site and the triple-lumen catheter was secured in place using a 3-0 silk suture, after which a sterile tegaderm dressing was applied. There were not any immediate complications. EBL < 30.     A post-procedure chest x-ray is pending at this time.     Dr. Allen was readily available during the entirety of the procedure.     Lydia Oleary CNP         No

## 2024-05-13 NOTE — ED PROVIDER NOTE - MUSCULOSKELETAL, MLM
right shoulder +TTP anteriorly, no swelling, no deformity, +FROM.  right elbow with no bony tenderness, +FROM, no swelling or deformity.  right forearm +TTP to mid forearm with no swelling, no deformity, no ecchymosis, wrist with no bony tenderness, +FROM.  right hand +mild swelling and TTP to dorsum, no deformity, +FROM x 5 digits

## 2024-05-13 NOTE — ED ADULT NURSE NOTE - NSFALLUNIVINTERV_ED_ALL_ED
Bed/Stretcher in lowest position, wheels locked, appropriate side rails in place/Call bell, personal items and telephone in reach/Instruct patient to call for assistance before getting out of bed/chair/stretcher/Non-slip footwear applied when patient is off stretcher/[place] to call system/Physically safe environment - no spills, clutter or unnecessary equipment/Purposeful proactive rounding/Room/bathroom lighting operational, light cord in reach

## 2024-05-13 NOTE — ED PROVIDER NOTE - CARE PROVIDER_API CALL
Norbert Prince.  Orthopaedic Surgery  [address] [place] [address], [address]  [place] [address]  Phone: [phone]  Fax: [phone]  Follow Up Time:

## 2024-05-13 NOTE — ED ADULT NURSE NOTE - OBJECTIVE STATEMENT
Patient presents to the ED complaining of a trip and fall yesterday where she hurt her right shoulder, right elbow and hand. Patient noted to have right hand swelling.

## 2024-05-13 NOTE — ED PROVIDER NOTE - CLINICAL SUMMARY MEDICAL DECISION MAKING FREE TEXT BOX
70 y/o f hx RA presents s/p trip and fall yesterday c/o right arm pain.  No head trauma, no neuro deficits on exam.  Xrays neg for fractures, will d/c, recommend tylenol/ibuprofen PRN pain, f/u ortho if pain persists

## 2024-05-16 DIAGNOSIS — Z88.0 ALLERGY STATUS TO PENICILLIN: ICD-10-CM

## 2024-05-16 DIAGNOSIS — Z91.013 ALLERGY TO SEAFOOD: ICD-10-CM
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
## 2024-05-16 DIAGNOSIS — M25.511 PAIN IN RIGHT SHOULDER: ICD-10-CM

## 2024-05-16 DIAGNOSIS — M79.601 PAIN IN RIGHT ARM: ICD-10-CM

## 2024-05-16 DIAGNOSIS — R22.31 LOCALIZED SWELLING, MASS AND LUMP, RIGHT UPPER LIMB: ICD-10-CM

## 2024-05-16 DIAGNOSIS — M06.9 RHEUMATOID ARTHRITIS, UNSPECIFIED: ICD-10-CM

## 2024-05-21 ENCOUNTER — APPOINTMENT (OUTPATIENT)
Dept: ORTHOPEDIC SURGERY | Facility: CLINIC | Age: 69
End: 2024-05-21
Payer: MEDICARE

## 2024-05-21 VITALS — BODY MASS INDEX: 23.14 KG/M2 | HEIGHT: 66 IN | RESPIRATION RATE: 16 BRPM | WEIGHT: 144 LBS

## 2024-05-21 DIAGNOSIS — M25.531 PAIN IN RIGHT WRIST: ICD-10-CM

## 2024-05-21 DIAGNOSIS — M25.532 PAIN IN RIGHT WRIST: ICD-10-CM

## 2024-05-21 PROCEDURE — 20605 DRAIN/INJ JOINT/BURSA W/O US: CPT | Mod: 50

## 2024-05-21 PROCEDURE — 73110 X-RAY EXAM OF WRIST: CPT | Mod: 50

## 2024-05-21 PROCEDURE — 99204 OFFICE O/P NEW MOD 45 MIN: CPT | Mod: 25

## 2024-05-25 NOTE — ED ADULT TRIAGE NOTE - TEMPERATURE IN FAHRENHEIT (DEGREES F)
pt c/o epigastric pain after eating dinner tonight. states area tender to touch. no past medical hx. pt denies chest pain, vomiting. pt well appearing. awaiting ekg.
98.4

## 2024-07-17 ENCOUNTER — APPOINTMENT (OUTPATIENT)
Dept: RADIOLOGY | Facility: CLINIC | Age: 69
End: 2024-07-17
Payer: MEDICARE

## 2024-07-17 PROCEDURE — 71046 X-RAY EXAM CHEST 2 VIEWS: CPT

## 2024-09-15 ENCOUNTER — EMERGENCY (EMERGENCY)
Facility: HOSPITAL | Age: 69
LOS: 1 days | Discharge: HOME | End: 2024-09-15
Attending: EMERGENCY MEDICINE | Admitting: EMERGENCY MEDICINE
Payer: MEDICARE

## 2024-09-15 VITALS
TEMPERATURE: 98 F | SYSTOLIC BLOOD PRESSURE: 152 MMHG | HEART RATE: 73 BPM | OXYGEN SATURATION: 98 % | DIASTOLIC BLOOD PRESSURE: 94 MMHG | WEIGHT: 156.09 LBS | RESPIRATION RATE: 18 BRPM

## 2024-09-15 DIAGNOSIS — Z90.49 ACQUIRED ABSENCE OF OTHER SPECIFIED PARTS OF DIGESTIVE TRACT: Chronic | ICD-10-CM

## 2024-09-15 PROCEDURE — 99283 EMERGENCY DEPT VISIT LOW MDM: CPT

## 2024-09-15 PROCEDURE — 99284 EMERGENCY DEPT VISIT MOD MDM: CPT

## 2024-09-15 RX ORDER — BACITRACIN 500 UNIT/G
1 OINTMENT (GRAM) TOPICAL ONCE
Refills: 0 | Status: COMPLETED | OUTPATIENT
Start: 2024-09-15 | End: 2024-09-15

## 2024-09-15 RX ADMIN — Medication 1 APPLICATION(S): at 22:40

## 2024-09-15 NOTE — ED PROVIDER NOTE - NSFOLLOWUPINSTRUCTIONS_ED_ALL_ED_FT
You can apply Bacitracin or Neosporin to the area. If you have redness, swelling, pus-like drainage, fever, or other concerning symptoms, return for re-evaluation.     Abrasion  An abrasion is a cut or scrape that affects only the surface of the skin. The injury doesn't go through all the layers of the skin. Still, an abrasion can cause pain because it leaves the skin and its nerves open.    Abrasions are usually minor injuries that can be treated at home. You must care for your abrasion to prevent infection.    What are the causes?  Abrasions happen when something rubs, scrapes, or scratches your skin. This can happen when you fall on a hard or rough surface. Or, when a bush in your yard scratches you. When your skin rubs against something, some layers of skin may come off. You may also see tiny tears on your skin.    What are the signs or symptoms?  The main symptom of this condition is a cut or a scrape. The cut or scrape may bleed. It may also appear red or pink. If your abrasion is caused by a fall, there may be a bruise under your cut or scrape.    How is this diagnosed?  An abrasion is diagnosed with a physical exam.    How is this treated?  Treatment for this condition depends on how large and deep the abrasion is. In most cases:  Your abrasion will be cleaned with water and mild soap.  An antibiotic may be put on the wound to prevent infection.  A petroleum jelly may be put on the wound to prevent moisture.  A bandage may be placed on your abrasion to keep it clean.  You may need a tetanus shot.    Follow these instructions at home:  Medicines    Take over-the-counter and prescription medicines only as told by your health care provider.  If you were prescribed antibiotics, use them as told by your provider. Do not stop using them even if you start to feel better.  Wound care    Clean your wound 1–2 times a day, or as told by your provider.  Wash your hands for at least 20 seconds with mild soap and water. Do this before and after you clean your wound.  If soap and water are not available, use hand  to clean your hands.  Wash your wound using mild soap and water, a wound cleanser, or a saltwater solution. A saltwater solution is also called saline. Do not use hydrogen peroxide or alcohol. These can slow healing.  Rinse off the soap.  Pat your wound dry with a clean towel. Do not rub your wound.  Put an antibiotic ointment on your wound as told by your provider. You may also be told to put on a jelly that prevents moisture from entering the wound.  Cover your wound with a bandage as told by your provider. Small or very minor abrasions may not need a bandage.  Keep your bandage clean and dry as told by your provider.  There are many ways to close and cover a wound. Follow instructions from your provider about changing and removing your bandage. You may have to change your bandage one or more times a day, or as told by your provider.  Check your wound every day for signs of infection. Check for:  Redness. Watch for a red streak that spreads out from your wound.  Swelling or worse pain.  Warmth.  Blood, fluid, pus, or a bad smell.  Managing pain and swelling    Bag of ice on a towel on the skin.   If told, put ice on the injured area.  Put ice in a plastic bag.  Place a towel between your skin and the bag.  Leave the ice on for 20 minutes, 2–3 times a day.  If your skin turns bright red, remove the ice right away to prevent skin damage. The risk of damage is higher if you cannot feel pain, heat, or cold.  If possible, raise the injured area above the level of your heart while you are sitting or lying down.  General instructions    Do not take baths, swim, or use a hot tub until your provider approves. Ask your provider if you may take showers. You may only be allowed to take sponge baths.  Do not scratch or pick at scabs that may occur over the wound as it heals.  Contact a health care provider if:  You got a tetanus shot, and you have swelling, severe pain, redness, or bleeding at the site of your shot.  Your pain is worse and medicines do not help.  You have a fever.  You have any of signs of infection.  Get help right away if:  You have a red streak spreading away from your wound.  This information is not intended to replace advice given to you by your health care provider. Make sure you discuss any questions you have with your health care provider.

## 2024-09-15 NOTE — ED PROVIDER NOTE - PHYSICAL EXAMINATION
Constitutional: Well appearing, awake, alert, oriented to person, place, time/situation and in no apparent distress.  ENMT: Airway patent.   Musculoskeletal: Range of motion is not limited  Neuro: Alert and oriented x 3, face symmetric and speech fluent. Nml gross motor movement, grossly non focal   Skin: Skin normal color for race, warm, dry and intact. abrasion w/o signs of infection to R forearm.   Psych: Alert and oriented to person, place, time/situation. normal mood and affect. no apparent risk to self or others.

## 2024-09-15 NOTE — ED PROVIDER NOTE - NS ED ROS FT
Constitutional: No fever or chills.   Skin: No skin rash.   Except as documented in the HPI, all other systems are negative.

## 2024-09-15 NOTE — ED PROVIDER NOTE - CLINICAL SUMMARY MEDICAL DECISION MAKING FREE TEXT BOX
Pt p/w wound to forearm, does not have appearance of insect bite, more c/w abrasion. No signs of infection. Bacitracin. Return precautions

## 2024-09-15 NOTE — ED ADULT NURSE NOTE - OBJECTIVE STATEMENT
Pt c/o of scratch on R forearm after waking up from a nap today. Small abrasion noted on R forearm. no active bleeding

## 2024-09-15 NOTE — ED PROVIDER NOTE - PATIENT PORTAL LINK FT
You can access the FollowMyHealth Patient Portal offered by [facility] by registering at the following website: [email or web address]. By joining [employer]’s FollowMyHealth portal, you will also be able to view your health information using other applications (apps) compatible with our system.

## 2024-09-15 NOTE — ED PROVIDER NOTE - NSDCPRINTRESULTS_ED_ALL_ED
Patient requests all Lab, Cardiology, and Radiology Results on their Discharge Instructions sore throat

## 2024-09-15 NOTE — ED PROVIDER NOTE - OBJECTIVE STATEMENT
Pt w/ PMHx RA p/w wound to R forearm. She took a nap and noted this on the volar surface of her forearm. Not itchy, nor painful. No fever

## 2024-09-18 DIAGNOSIS — M06.9 RHEUMATOID ARTHRITIS, UNSPECIFIED: ICD-10-CM

## 2024-09-18 DIAGNOSIS — Z91.013 ALLERGY TO SEAFOOD: ICD-10-CM
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
## 2024-09-18 DIAGNOSIS — Z88.0 ALLERGY STATUS TO PENICILLIN: ICD-10-CM

## 2024-09-18 DIAGNOSIS — S50.811A ABRASION OF RIGHT FOREARM, INITIAL ENCOUNTER: ICD-10-CM
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
## 2024-09-18 DIAGNOSIS — S51.801A UNSPECIFIED OPEN WOUND OF RIGHT FOREARM, INITIAL ENCOUNTER: ICD-10-CM

## 2024-11-27 NOTE — DISCHARGE NOTE PROVIDER - NSDCCPTREATMENT_GEN_ALL_CORE_FT
Pt reported that she is taking Rosuvastatin 20 mg, and not 10 mg. Pt double checked her Rosuvastatin med bottle while talking to me and stated was prescribed by her previous cardiologist. Per pt she did not notice about it during her recent appointment with us.  With the recent blood test results, per pt, she has been taking Rosuvastatin 20 mg daily and confirms compliance.  Pt is requesting 20 mg Rosuvastatin refill, she has 1 tablet left.  Will send refill & update Dr. Knight.  
PRINCIPAL PROCEDURE  Procedure: Coronary angiography in cardiac catheterization laboratory  Findings and Treatment: You underwent a Diagnostic Cardiac Catheterization today. Avoid strenuous activity and heavy lifting for the next five days. You may shower, but please avoid baths, hot tubs, or swimming for Five days to prevent infection. For any bleeding or hematoma formation (hardened blood collection under the skin) at the access site, please hold pressure and go to the Emergency Room. Please seek medical attention for recurrent or severe chest pain.

## 2025-03-14 NOTE — ED ADULT TRIAGE NOTE - ESI TRIAGE ACUITY LEVEL, MLM
2 Detail Level: Detailed Quality 226: Preventive Care And Screening: Tobacco Use: Screening And Cessation Intervention: Patient screened for tobacco use and is an ex/non-smoker

## 2025-07-18 NOTE — ED ADULT TRIAGE NOTE - RESPIRATORY RATE (BREATHS/MIN)
You have done very well since your operation.  We discussed the [facility] recommendations.  I recommendation made for you to follow-up with medical oncology.  You already have an appointment on July 23.  Recommendations also made for radiation oncology and you have an appointment on July 22.  An additional recommendation was made for genetics referral and we will set this up for you.  I want you to apply topical lotion to your left breast.  I will see you back in 3 months.  
16